# Patient Record
Sex: FEMALE | Race: WHITE | HISPANIC OR LATINO | Employment: UNEMPLOYED | URBAN - METROPOLITAN AREA
[De-identification: names, ages, dates, MRNs, and addresses within clinical notes are randomized per-mention and may not be internally consistent; named-entity substitution may affect disease eponyms.]

---

## 2017-02-27 ENCOUNTER — HOSPITAL ENCOUNTER (EMERGENCY)
Facility: HOSPITAL | Age: 32
Discharge: HOME | End: 2017-02-27
Attending: EMERGENCY MEDICINE
Payer: MEDICAID

## 2017-02-27 VITALS
DIASTOLIC BLOOD PRESSURE: 79 MMHG | HEART RATE: 66 BPM | SYSTOLIC BLOOD PRESSURE: 125 MMHG | HEIGHT: 64 IN | BODY MASS INDEX: 25.1 KG/M2 | RESPIRATION RATE: 20 BRPM | WEIGHT: 147 LBS | OXYGEN SATURATION: 100 % | TEMPERATURE: 99 F

## 2017-02-27 DIAGNOSIS — R05.9 COUGH: ICD-10-CM

## 2017-02-27 DIAGNOSIS — J40 BRONCHITIS: Primary | ICD-10-CM

## 2017-02-27 DIAGNOSIS — Z72.0 TOBACCO ABUSE DISORDER: ICD-10-CM

## 2017-02-27 LAB
B-HCG UR QL: NEGATIVE
CTP QC/QA: YES
DEPRECATED S PYO AG THROAT QL EIA: NEGATIVE

## 2017-02-27 PROCEDURE — 94640 AIRWAY INHALATION TREATMENT: CPT

## 2017-02-27 PROCEDURE — 25000242 PHARM REV CODE 250 ALT 637 W/ HCPCS: Performed by: NURSE PRACTITIONER

## 2017-02-27 PROCEDURE — 87880 STREP A ASSAY W/OPTIC: CPT

## 2017-02-27 PROCEDURE — 99284 EMERGENCY DEPT VISIT MOD MDM: CPT | Mod: 25

## 2017-02-27 PROCEDURE — 81025 URINE PREGNANCY TEST: CPT | Performed by: EMERGENCY MEDICINE

## 2017-02-27 PROCEDURE — 87081 CULTURE SCREEN ONLY: CPT

## 2017-02-27 PROCEDURE — 63600175 PHARM REV CODE 636 W HCPCS: Performed by: NURSE PRACTITIONER

## 2017-02-27 RX ORDER — IPRATROPIUM BROMIDE AND ALBUTEROL SULFATE 2.5; .5 MG/3ML; MG/3ML
3 SOLUTION RESPIRATORY (INHALATION)
Status: COMPLETED | OUTPATIENT
Start: 2017-02-27 | End: 2017-02-27

## 2017-02-27 RX ORDER — PREDNISONE 20 MG/1
60 TABLET ORAL
Status: COMPLETED | OUTPATIENT
Start: 2017-02-27 | End: 2017-02-27

## 2017-02-27 RX ORDER — CETIRIZINE HYDROCHLORIDE 10 MG/1
10 TABLET ORAL DAILY
Qty: 30 TABLET | Refills: 0 | Status: SHIPPED | OUTPATIENT
Start: 2017-02-27 | End: 2017-05-07

## 2017-02-27 RX ORDER — ALBUTEROL SULFATE 90 UG/1
1-2 AEROSOL, METERED RESPIRATORY (INHALATION) EVERY 6 HOURS PRN
Qty: 1 INHALER | Refills: 0 | Status: SHIPPED | OUTPATIENT
Start: 2017-02-27 | End: 2017-07-26 | Stop reason: SDUPTHER

## 2017-02-27 RX ORDER — PREDNISONE 20 MG/1
40 TABLET ORAL DAILY
Qty: 10 TABLET | Refills: 0 | Status: SHIPPED | OUTPATIENT
Start: 2017-02-27 | End: 2017-03-04

## 2017-02-27 RX ADMIN — PREDNISONE 60 MG: 20 TABLET ORAL at 09:02

## 2017-02-27 RX ADMIN — IPRATROPIUM BROMIDE AND ALBUTEROL SULFATE 3 ML: .5; 3 SOLUTION RESPIRATORY (INHALATION) at 09:02

## 2017-02-27 NOTE — ED AVS SNAPSHOT
[facility]  [address]  [place] [address]               Vani Hernandez   2017  8:47 PM   ED    Description:  Female : 1985   Department:  [facility]           Your Care was Coordinated By:     Provider Role From To    Aurea Del Cid MD Attending Provider 17 --    Augustus Rosenthal NP Nurse Practitioner 17 --      Reason for Visit     Sore Throat           Diagnoses this Visit        Comments    Bronchitis    -  Primary     Cough         Tobacco abuse disorder           ED Disposition     None           To Do List           Follow-up Information     Follow up with [facility] - [facility]. Schedule an appointment as soon as possible for a visit in 1 week.    Specialty:  Family Medicine    Contact information:    [address] [id number] [phone]         These Medications        Disp Refills Start End    albuterol 90 mcg/actuation inhaler 1 Inhaler 0 2017    Inhale 1-2 puffs into the lungs every 6 (six) hours as needed for Wheezing. Rescue - Inhalation    predniSONE (DELTASONE) 20 MG tablet 10 tablet 0 2017 3/4/2017    Take 2 tablets (40 mg total) by mouth once daily. - Oral    cetirizine (ZYRTEC) 10 MG tablet 30 tablet 0 2017    Take 1 tablet (10 mg total) by mouth once daily. - Oral      [facility]     [facility] On Call Nurse Care Line -  Assistance  Registered nurses in the [facility] provide clinical advisement, health education, appointment booking, and other advisory services.  Call for this free service at [phone].             Medications           Message regarding Medications     Verify the changes and/or additions to your medication regime listed below are the same as discussed with your clinician today.  If any of these changes or additions are incorrect, please notify your healthcare provider.        START  taking these NEW medications        Refills    albuterol 90 mcg/actuation inhaler 0    Sig: Inhale 1-2 puffs into the lungs every 6 (six) hours as needed for Wheezing. Rescue    Class: Print    Route: Inhalation    predniSONE (DELTASONE) 20 MG tablet 0    Sig: Take 2 tablets (40 mg total) by mouth once daily.    Class: Print    Route: Oral    cetirizine (ZYRTEC) 10 MG tablet 0    Sig: Take 1 tablet (10 mg total) by mouth once daily.    Class: Print    Route: Oral      These medications were administered today        Dose Freq    albuterol-ipratropium 2.5mg-0.5mg/3mL nebulizer solution 3 mL 3 mL ED 1 Time    Sig: Take 3 mLs by nebulization ED 1 Time.    Class: Normal    Route: Nebulization    predniSONE tablet 60 mg 60 mg ED 1 Time    Sig: Take 3 tablets (60 mg total) by mouth ED 1 Time.    Class: Normal    Route: Oral      STOP taking these medications     ALBUTEROL INHL Inhale into the lungs.           Verify that the below list of medications is an accurate representation of the medications you are currently taking.  If none reported, the list may be blank. If incorrect, please contact your healthcare provider. Carry this list with you in case of emergency.           Current Medications     UNKNOWN TO PATIENT     albuterol 90 mcg/actuation inhaler Inhale 1-2 puffs into the lungs every 6 (six) hours as needed for Wheezing. Rescue    cetirizine (ZYRTEC) 10 MG tablet Take 1 tablet (10 mg total) by mouth once daily.    predniSONE (DELTASONE) 20 MG tablet Take 2 tablets (40 mg total) by mouth once daily.           Clinical Reference Information           Your Vitals Were     BP                   123/82 (BP Location: Left arm, Patient Position: Sitting)           Allergies as of 2/27/2017     No Known Allergies      Immunizations Administered on Date of Encounter - 2/27/2017     None      ED Micro, Lab, POCT     Start Ordered       Status Ordering Provider    02/27/17 1951 02/27/17 1950  POCT urine pregnancy  Once       Final result     02/27/17 1951 02/27/17 1950  Rapid strep screen  STAT      Final result     02/27/17 1950 02/27/17 1950  Strep A culture, throat  Once      In process       ED Imaging Orders     Start Ordered       Status Ordering Provider    02/27/17 2101 02/27/17 2100  X-Ray Chest PA And Lateral  1 time imaging      Final result         Discharge Instructions         Bronchitis with Wheezing (Viral or Bacterial: Adult)    Bronchitis is an infection of the air passages. It often occurs during a cold and is usually caused by a virus. Symptoms include cough with mucus (phlegm) and low-grade fever. This illness is contagious during the first few days and is spread through the air by coughing and sneezing, or by direct contact (touching the sick person and then touching your own eyes, nose, or mouth).  If there is a lot of inflammation, air flow is restricted. The air passages may also go into spasm, especially if you have asthma. This causes wheezing and difficulty breathing even in people who do not have asthma.  Bronchitis usually lasts 7 to 14 days. The wheezing should improve with treatment during the first week. An inhaler is often prescribed to relax the air passages and stop wheezing. Antibiotics will be prescribed if your doctor thinks there is also a secondary bacterial infection.  Home care  · If symptoms are severe, rest at home for the first 2 to 3 days. When you go back to your usual activities, don't let yourself get too tired.  · Do not smoke. Also avoid being exposed to secondhand smoke.  · You may use over-the-counter medicine to control fever or pain, unless another medicine was prescribed. Note: If you have chronic liver or kidney disease or have ever had a stomach ulcer or gastrointestinal bleeding, talk with your healthcare provider before using these medicines. Also talk to your provider if you are taking medicine to prevent blood clots.) Aspirin should never be given to anyone younger than 18  years of age who is ill with a viral infection or fever. It may cause severe liver or brain damage.  · Your appetite may be poor, so a light diet is fine. Avoid dehydration by drinking 6 to 8 glasses of fluids per day (such as water, soft drinks, sports drinks, juices, tea, or soup). Extra fluids will help loosen secretions in the nose and lungs.  · Over-the-counter cough, cold, and sore-throat medicines will not shorten the length of the illness, but they may be helpful to reduce symptoms. (Note: Do not use decongestants if you have high blood pressure.)  · If you were given an inhaler, use it exactly as directed. If you need to use it more often than prescribed, your condition may be worsening. If this happens, contact your healthcare provider.  · If prescribed, finish all antibiotic medicine, even if you are feeling better after only a few days.  Follow-up care  Follow up with your healthcare provider, or as advised. If you had an X-ray or ECG (electrocardiogram), a specialist will review it. You will be notified of any new findings that may affect your care.  Note: If you are age 65 or older, or if you have a chronic lung disease or condition that affects your immune system, or you smoke, talk to your healthcare provider about having a pneumococcal vaccinations and a yearly influenza vaccination (flu shot).  When to seek medical advice  Call your healthcare provider right away if any of these occur:  · Fever of 100.4°F (38°C) or higher  · Coughing up increasing amounts of colored sputum  · Weakness, drowsiness, headache, facial pain, ear pain, or a stiff neck  Call 911, or get immediate medical care  Contact emergency services right away if any of these occur.  · Coughing up blood  · Worsening weakness, drowsiness, headache, or stiff neck  · Increased wheezing not helped with medication, shortness of breath, or pain with breathing  Date Last Reviewed: 9/13/2015  © 8082-3004 [employer] [address]  [place] [address]. All rights reserved. This information is not intended as a substitute for professional medical care. Always follow your healthcare professional's instructions.          Discharge References/Attachments     SMOKING CESSATION (ENGLISH)      [facility] Sign-Up     Activating your [facility] account is as easy as 1-2-3!     1) Visit [email or web address], select Sign Up Now, enter this activation code and your date of birth, then select Next.  [email or web address]  Expires: 4/13/2017  8:00 PM      2) Create a username and password to use when you visit [facility] in the future and select a security question in case you lose your password and select Next.    3) Enter your e-mail address and click Sign Up!    Additional Information  If you have questions, please e-mail [email or web address] or call [phone] to talk to our [facility] staff. Remember, [facility] is NOT to be used for urgent needs. For medical emergencies, dial 911.          [facility] complies with applicable Federal civil rights laws and does not discriminate on the basis of race, color, national origin, age, disability, or sex.        Language Assistance Services     ATTENTION: Language assistance services are available, free of charge. Please call [phone].      ATENCIÓN: Si habla español, tiene a juarez disposición servicios gratuitos de asistencia lingüística. Llame al [phone].     CHÚ Ý: N?u b?n nói Ti?ng Vi?t, có các d?ch v? h? tr? ngôn ng? mi?n phí dành cho b?n. G?i s? [phone].

## 2017-02-28 NOTE — ED NOTES
Pt given instructions for clean catch urine sample. Pt verbalized understanding.  Pt ambulatory to bathroom.

## 2017-02-28 NOTE — ED NOTES
Sore throat with chest tightness x 2 days.  Hx of asthma, has been out of albuterol inhaler x 6 mos.

## 2017-02-28 NOTE — DISCHARGE INSTRUCTIONS
Bronchitis with Wheezing (Viral or Bacterial: Adult)    Bronchitis is an infection of the air passages. It often occurs during a cold and is usually caused by a virus. Symptoms include cough with mucus (phlegm) and low-grade fever. This illness is contagious during the first few days and is spread through the air by coughing and sneezing, or by direct contact (touching the sick person and then touching your own eyes, nose, or mouth).  If there is a lot of inflammation, air flow is restricted. The air passages may also go into spasm, especially if you have asthma. This causes wheezing and difficulty breathing even in people who do not have asthma.  Bronchitis usually lasts 7 to 14 days. The wheezing should improve with treatment during the first week. An inhaler is often prescribed to relax the air passages and stop wheezing. Antibiotics will be prescribed if your doctor thinks there is also a secondary bacterial infection.  Home care  · If symptoms are severe, rest at home for the first 2 to 3 days. When you go back to your usual activities, don't let yourself get too tired.  · Do not smoke. Also avoid being exposed to secondhand smoke.  · You may use over-the-counter medicine to control fever or pain, unless another medicine was prescribed. Note: If you have chronic liver or kidney disease or have ever had a stomach ulcer or gastrointestinal bleeding, talk with your healthcare provider before using these medicines. Also talk to your provider if you are taking medicine to prevent blood clots.) Aspirin should never be given to anyone younger than 18 years of age who is ill with a viral infection or fever. It may cause severe liver or brain damage.  · Your appetite may be poor, so a light diet is fine. Avoid dehydration by drinking 6 to 8 glasses of fluids per day (such as water, soft drinks, sports drinks, juices, tea, or soup). Extra fluids will help loosen secretions in the nose and lungs.  · Over-the-counter  cough, cold, and sore-throat medicines will not shorten the length of the illness, but they may be helpful to reduce symptoms. (Note: Do not use decongestants if you have high blood pressure.)  · If you were given an inhaler, use it exactly as directed. If you need to use it more often than prescribed, your condition may be worsening. If this happens, contact your healthcare provider.  · If prescribed, finish all antibiotic medicine, even if you are feeling better after only a few days.  Follow-up care  Follow up with your healthcare provider, or as advised. If you had an X-ray or ECG (electrocardiogram), a specialist will review it. You will be notified of any new findings that may affect your care.  Note: If you are age 65 or older, or if you have a chronic lung disease or condition that affects your immune system, or you smoke, talk to your healthcare provider about having a pneumococcal vaccinations and a yearly influenza vaccination (flu shot).  When to seek medical advice  Call your healthcare provider right away if any of these occur:  · Fever of 100.4°F (38°C) or higher  · Coughing up increasing amounts of colored sputum  · Weakness, drowsiness, headache, facial pain, ear pain, or a stiff neck  Call 911, or get immediate medical care  Contact emergency services right away if any of these occur.  · Coughing up blood  · Worsening weakness, drowsiness, headache, or stiff neck  · Increased wheezing not helped with medication, shortness of breath, or pain with breathing  Date Last Reviewed: 9/13/2015  © 9302-7487 [employer]. [address] [place] [address], [place] [address]. All rights reserved. This information is not intended as a substitute for professional medical care. Always follow your healthcare professional's instructions.

## 2017-02-28 NOTE — ED PROVIDER NOTES
Encounter Date: 2/27/2017       History     Chief Complaint   Patient presents with    Sore Throat     sore throat and chest tigtness x 2 days. pt has hx of asthma and states that she is out of albuterol. NAD at present.      Review of patient's allergies indicates:  No Known Allergies  HPI Comments: Well appearing, non toxic, afebrile 31 year old female with PMH of asthma presents with c/o sore throat and cough productive of green sputum x 1 week. Pt reports being out of Albuterol inhaler for past couple of month since moving from [place]. No reported fever, chills, SOB, abdominal pain, N/V/D, and urinary symptoms. She does reports some intermittent chest tightness and wheezing. Denies any treatments PTA.    Patient is a 31 y.o. female presenting with the following complaint: URI. The history is provided by the patient.   URI   The primary symptoms include sore throat, cough and wheezing. Primary symptoms do not include fever, fatigue, headaches, ear pain, swollen glands, abdominal pain, nausea, vomiting, myalgias, arthralgias or rash. Illness onset: 1 week ago. This is a new problem. The problem has been gradually worsening.   The sore throat began more than 2 days ago. The sore throat has been gradually improving since its onset. The sore throat is not accompanied by trouble swallowing, drooling, hoarse voice or stridor. The sore throat pain is at a severity of 6/10.   The cough began 6 to 7 days ago. The cough is productive. The sputum is green. It is exacerbated by smoking.   Wheezing began more than 2 days ago. Wheezing occurs intermittently. The wheezing was precipitated by smoke. The patient's medical history is significant for asthma. The patient's medical history does not include COPD or bronchiolitis.   The illness is not associated with chills, plugged ear sensation, facial pain, sinus pressure, congestion or rhinorrhea.     Past Medical History:   Diagnosis Date    Asthma      History reviewed. No  pertinent surgical history.  History reviewed. No pertinent family history.  Social History   Substance Use Topics    Smoking status: Never Smoker    Smokeless tobacco: None    Alcohol use No     Review of Systems   Constitutional: Negative for chills, fatigue and fever.   HENT: Positive for sore throat. Negative for congestion, drooling, ear pain, hoarse voice, rhinorrhea, sinus pressure and trouble swallowing.    Respiratory: Positive for cough, chest tightness and wheezing. Negative for shortness of breath and stridor.    Cardiovascular: Negative for chest pain.   Gastrointestinal: Negative for abdominal distention, abdominal pain, constipation, diarrhea, nausea and vomiting.   Genitourinary: Negative for difficulty urinating and dysuria.   Musculoskeletal: Negative for arthralgias, back pain, gait problem, joint swelling, myalgias, neck pain and neck stiffness.   Skin: Negative for color change, pallor, rash and wound.   Neurological: Negative for dizziness and headaches.       Physical Exam   Initial Vitals   BP Pulse Resp Temp SpO2   02/27/17 1949 02/27/17 1949 02/27/17 1949 02/27/17 1949 02/27/17 1949   123/82 90 18 98.3 °F (36.8 °C) 98 %     Physical Exam    Nursing note and vitals reviewed.  Constitutional: She appears well-developed and well-nourished. She is not diaphoretic. She is cooperative.  Non-toxic appearance. She does not have a sickly appearance. She does not appear ill. No distress.   HENT:   Head: Normocephalic and atraumatic.   Right Ear: Hearing, tympanic membrane, external ear and ear canal normal.   Left Ear: Hearing, tympanic membrane, external ear and ear canal normal.   Nose: Nose normal.   Mouth/Throat: Uvula is midline and mucous membranes are normal. Posterior oropharyngeal erythema present. No oropharyngeal exudate, posterior oropharyngeal edema or tonsillar abscesses.   Eyes: Conjunctivae and EOM are normal. Pupils are equal, round, and reactive to light. Right eye exhibits no  discharge. Left eye exhibits no discharge.   Neck: Normal range of motion. Neck supple. No spinous process tenderness and no muscular tenderness present. No edema, no erythema and normal range of motion present. No rigidity.   Cardiovascular: Normal rate, regular rhythm and intact distal pulses.   Pulmonary/Chest: Effort normal. No accessory muscle usage. No apnea, no tachypnea and no bradypnea. No respiratory distress. She has decreased breath sounds. She has no wheezes. She has no rhonchi. She has no rales. She exhibits no tenderness.   Abdominal: Soft. Bowel sounds are normal. She exhibits no distension. There is no tenderness. There is no rigidity, no rebound, no guarding and no CVA tenderness.   Musculoskeletal: Normal range of motion. She exhibits no edema or tenderness.   Lymphadenopathy:     She has no cervical adenopathy.   Neurological: She is alert and oriented to person, place, and time. She has normal strength. Coordination and gait normal. GCS eye subscore is 4. GCS verbal subscore is 5. GCS motor subscore is 6.   Skin: Skin is warm and dry. No rash and no abscess noted. No erythema. No pallor.   Psychiatric: She has a normal mood and affect. Her behavior is normal. Judgment and thought content normal.         ED Course   Procedures  Labs Reviewed   THROAT SCREEN, RAPID   CULTURE, STREP A,  THROAT   POCT URINE PREGNANCY        Imaging Results         X-Ray Chest PA And Lateral (Final result) Result time:  02/27/17 21:38:45    Final result by Mina Lizama MD (02/27/17 21:38:45)    Impression:     No acute process identified.        Electronically signed by: MINA LIZAMA MD  Date:     02/27/17  Time:    21:38     Narrative:    Cough.    Comparison: None.     Findings:2 view examination.No pneumothorax. The cardiomediastinal contour is within normal limits. No findings to suggest pleural effusions. The lungs are clear.                 Medical Decision Making:   Initial Assessment:   Well appearing,  non toxic, afebrile 31 year old female with PMH of asthma presents with c/o sore throat and cough productive of green sputum x 1 week. Pt reports being out of Albuterol inhaler for past couple of month since moving from [place]. No reported fever, chills, SOB, abdominal pain, N/V/D, and urinary symptoms. She does reports some intermittent chest tightness and wheezing. Denies any treatments PTA. Pt is AAO x3, PERRLA, EOMs intact. Neck supple, non tender, full ROM, no adenopathy. Oropharyngeal erythema without exudates, uvula midline, no evidence of peritonsillar abscess. Chest non tender, breath sounds diminished but CTA, no tachypnea, resp even and unlabored, symmetrical chest rise. Abdomen soft, non tender, non distended, BS active.  Differential Diagnosis:   Asthma Exacerbation, Pneumonia, Bronchitis, Viral Pharyngitis, Strep Pharyngitis.  Clinical Tests:   Lab Tests: Ordered and Reviewed  Radiological Study: Ordered and Reviewed  ED Management:  UPT negative, Rapid Strep negative. Chest XR no acute cardiopulmonary process. Treated in ED with Prednisone and Duo-neb. Pt reports feeling much better after treatments provided in ED, no respiratory distress, no tachypnea, breath sounds CTA, and reports relief of chest tightness. Afebrile. Low suspicion for pneumonia. Low suspicion for strep pharyngitis. Pt symptoms likely related to bronchitis. Will treat symptomatically with Albuterol inhaler, Prednisone, and Zyrtec. Increase oral intake of fluids. Stop smoking. FU with PCP provided within next week or sooner for no improvements or worsening. Discussed strict return precautions. Pt in agreement with POC, verbalized understanding.              Attending Attestation:     Physician Attestation Statement for NP/PA:   I discussed this assessment and plan of this patient with the NP/PA, but I did not personally examine the patient. The face to face encounter was performed by the NP/PA.                  ED Course      Clinical Impression:   The primary encounter diagnosis was Bronchitis. Diagnoses of Cough and Tobacco abuse disorder were also pertinent to this visit.          Augustus Rosenthal NP  02/27/17 [id number]       Aurea Del Cid MD  02/28/17 7095

## 2017-03-02 LAB — BACTERIA THROAT CULT: NORMAL

## 2017-05-07 ENCOUNTER — HOSPITAL ENCOUNTER (EMERGENCY)
Facility: HOSPITAL | Age: 32
Discharge: HOME | End: 2017-05-07
Attending: EMERGENCY MEDICINE
Payer: MEDICAID

## 2017-05-07 VITALS
OXYGEN SATURATION: 100 % | HEART RATE: 78 BPM | TEMPERATURE: 99 F | SYSTOLIC BLOOD PRESSURE: 125 MMHG | BODY MASS INDEX: 24.75 KG/M2 | WEIGHT: 145 LBS | HEIGHT: 64 IN | DIASTOLIC BLOOD PRESSURE: 77 MMHG | RESPIRATION RATE: 20 BRPM

## 2017-05-07 DIAGNOSIS — K42.9 UMBILICAL HERNIA WITHOUT OBSTRUCTION AND WITHOUT GANGRENE: Primary | ICD-10-CM

## 2017-05-07 LAB
ALBUMIN SERPL BCP-MCNC: 3.4 G/DL
ALP SERPL-CCNC: 72 U/L
ALT SERPL W/O P-5'-P-CCNC: 22 U/L
ANION GAP SERPL CALC-SCNC: 9 MMOL/L
AST SERPL-CCNC: 19 U/L
B-HCG UR QL: NEGATIVE
BASOPHILS # BLD AUTO: 0.05 K/UL
BASOPHILS NFR BLD: 0.6 %
BILIRUB SERPL-MCNC: 0.3 MG/DL
BILIRUB UR QL STRIP: NEGATIVE
BUN SERPL-MCNC: 29 MG/DL
CALCIUM SERPL-MCNC: 8.8 MG/DL
CHLORIDE SERPL-SCNC: 109 MMOL/L
CLARITY UR: CLEAR
CO2 SERPL-SCNC: 21 MMOL/L
COLOR UR: YELLOW
CREAT SERPL-MCNC: 0.8 MG/DL
CTP QC/QA: YES
DIFFERENTIAL METHOD: NORMAL
EOSINOPHIL # BLD AUTO: 0.3 K/UL
EOSINOPHIL NFR BLD: 3.1 %
ERYTHROCYTE [DISTWIDTH] IN BLOOD BY AUTOMATED COUNT: 13.3 %
EST. GFR  (AFRICAN AMERICAN): >60 ML/MIN/1.73 M^2
EST. GFR  (NON AFRICAN AMERICAN): >60 ML/MIN/1.73 M^2
GLUCOSE SERPL-MCNC: 105 MG/DL
GLUCOSE UR QL STRIP: NEGATIVE
HCT VFR BLD AUTO: 39.7 %
HGB BLD-MCNC: 12.9 G/DL
HGB UR QL STRIP: ABNORMAL
KETONES UR QL STRIP: NEGATIVE
LEUKOCYTE ESTERASE UR QL STRIP: NEGATIVE
LIPASE SERPL-CCNC: 59 U/L
LYMPHOCYTES # BLD AUTO: 2.7 K/UL
LYMPHOCYTES NFR BLD: 34 %
MCH RBC QN AUTO: 27.9 PG
MCHC RBC AUTO-ENTMCNC: 32.5 %
MCV RBC AUTO: 86 FL
MONOCYTES # BLD AUTO: 0.8 K/UL
MONOCYTES NFR BLD: 9.3 %
NEUTROPHILS # BLD AUTO: 4.3 K/UL
NEUTROPHILS NFR BLD: 52.8 %
NITRITE UR QL STRIP: NEGATIVE
PH UR STRIP: 6 [PH] (ref 5–8)
PLATELET # BLD AUTO: 231 K/UL
PMV BLD AUTO: 9.9 FL
POTASSIUM SERPL-SCNC: 4.2 MMOL/L
PROT SERPL-MCNC: 6.7 G/DL
PROT UR QL STRIP: NEGATIVE
RBC # BLD AUTO: 4.62 M/UL
SODIUM SERPL-SCNC: 139 MMOL/L
SP GR UR STRIP: 1.02 (ref 1–1.03)
URN SPEC COLLECT METH UR: ABNORMAL
UROBILINOGEN UR STRIP-ACNC: NEGATIVE EU/DL
WBC # BLD AUTO: 8.06 K/UL

## 2017-05-07 PROCEDURE — 83690 ASSAY OF LIPASE: CPT

## 2017-05-07 PROCEDURE — 80053 COMPREHEN METABOLIC PANEL: CPT

## 2017-05-07 PROCEDURE — 25500020 PHARM REV CODE 255: Performed by: EMERGENCY MEDICINE

## 2017-05-07 PROCEDURE — 85025 COMPLETE CBC W/AUTO DIFF WBC: CPT

## 2017-05-07 PROCEDURE — 81025 URINE PREGNANCY TEST: CPT

## 2017-05-07 PROCEDURE — 99284 EMERGENCY DEPT VISIT MOD MDM: CPT | Mod: 25

## 2017-05-07 PROCEDURE — 81003 URINALYSIS AUTO W/O SCOPE: CPT

## 2017-05-07 RX ORDER — KETOROLAC TROMETHAMINE 10 MG/1
10 TABLET, FILM COATED ORAL EVERY 8 HOURS
Qty: 10 TABLET | Refills: 0 | Status: SHIPPED | OUTPATIENT
Start: 2017-05-07 | End: 2017-10-27

## 2017-05-07 RX ADMIN — IOHEXOL 30 ML: 350 INJECTION, SOLUTION INTRAVENOUS at 08:05

## 2017-05-07 RX ADMIN — IOHEXOL 75 ML: 350 INJECTION, SOLUTION INTRAVENOUS at 10:05

## 2017-05-07 NOTE — ED NOTES
APPEARANCE: Alert, oriented and in no acute distress.  CARDIAC: Normal rate and rhythm, no murmur heard.   PERIPHERAL VASCULAR: peripheral pulses present. Normal cap refill. No edema. Warm to touch.    RESPIRATORY:Normal rate and effort, breath sounds clear bilaterally throughout chest. Respirations are equal and unlabored no obvious signs of distress.  GASTRO: soft, bowel sounds normal, Tenderness to lower and mid abdomen, no abdominal distention.  MUSC: Full ROM. No bony tenderness or soft tissue tenderness. No obvious deformity.  SKIN: Skin is warm and dry, normal skin turgor, mucous membranes moist.  NEURO: 5/5 strength major flexors/extensors bilaterally. Sensory intact to light touch bilaterally. [place] coma scale: eyes open spontaneously-4, oriented & converses-5, obeys commands-6. No neurological abnormalities.   MENTAL STATUS: awake, alert and aware of environment.  EYE: PERRL, both eyes: pupils brisk and reactive to light. Normal size.  ENT: EARS: no obvious drainage. NOSE: no active bleeding.   Abdominal Pain to center of abdomen and nausea since yesterday. Concerned that she may have a hernia

## 2017-05-07 NOTE — ED AVS SNAPSHOT
[facility]  [address]  [place] [address]               Vani Hernandez   2017  8:10 AM   ED    Description:  Female : 1985   Department:  [facility]           Your Care was Coordinated By:     Provider Role From To    Deysi Alas MD Attending Provider 17 0812 --      Reason for Visit     Abdominal Pain           Diagnoses this Visit        Comments    Umbilical hernia without obstruction and without gangrene    -  Primary       ED Disposition     None           To Do List           Follow-up Information     Schedule an appointment as soon as possible for a visit with Mellisa Loera MD.    Specialties:  General Surgery, Surgery    Contact information:    [address]  [address]  [place] [address]  [phone]         These Medications        Disp Refills Start End    ketorolac (TORADOL) 10 mg tablet 10 tablet 0 2017     Take 1 tablet (10 mg total) by mouth every 8 (eight) hours. - Oral      [facility]     [facility] -  Assistance  Unless otherwise directed by your provider, please contact [facility], our nurse care line that is available for  assistance.     Registered nurses in the [facility] provide: appointment scheduling, clinical advisement, health education, and other advisory services.  Call: [phone] (toll free)               Medications           Message regarding Medications     Verify the changes and/or additions to your medication regime listed below are the same as discussed with your clinician today.  If any of these changes or additions are incorrect, please notify your healthcare provider.        START taking these NEW medications        Refills    ketorolac (TORADOL) 10 mg tablet 0    Sig: Take 1 tablet (10 mg total) by mouth every 8 (eight) hours.    Class: Print    Route: Oral      These medications were administered today         "Dose Freq    omnipaque 350 iohexol 30 mL 30 mL IMG once as needed    Sig: Take 30 mLs by mouth ONCE PRN for contrast.    Class: Normal    Route: Oral    omnipaque 350 iohexol 75 mL 75 mL IMG once as needed    Sig: Inject 75 mLs into the vein ONCE PRN for contrast.    Class: Normal    Route: Intravenous      STOP taking these medications     cetirizine (ZYRTEC) 10 MG tablet Take 1 tablet (10 mg total) by mouth once daily.           Verify that the below list of medications is an accurate representation of the medications you are currently taking.  If none reported, the list may be blank. If incorrect, please contact your healthcare provider. Carry this list with you in case of emergency.           Current Medications     albuterol 90 mcg/actuation inhaler Inhale 1-2 puffs into the lungs every 6 (six) hours as needed for Wheezing. Rescue    UNKNOWN TO PATIENT     ketorolac (TORADOL) 10 mg tablet Take 1 tablet (10 mg total) by mouth every 8 (eight) hours.           Clinical Reference Information           Your Vitals Were     BP Pulse Temp Resp Height Weight    123/71 (BP Location: Right arm, Patient Position: Sitting) 79 98.5 °F (36.9 °C) (Oral) 18 5' 4" (1.626 m) 65.8 kg (145 lb)    Last Period SpO2 BMI          04/16/2017 97% 24.89 kg/m2        Allergies as of 5/7/2017     No Known Allergies      Immunizations Administered on Date of Encounter - 5/7/2017     None      ED Micro, Lab, POCT     Start Ordered       Status Ordering Provider    05/07/17 0814 05/07/17 0813  Lipase  STAT      Final result     05/07/17 0814 05/07/17 0813  Urinalysis Clean Catch  STAT      Final result     05/07/17 0814 05/07/17 0813    Once,   Status:  Canceled      Canceled     05/07/17 0813 05/07/17 0813  CBC auto differential  STAT      Final result     05/07/17 0813 05/07/17 0813  Comprehensive metabolic panel  STAT      Final result     05/07/17 0000 05/07/17 0831  POCT urine pregnancy     Comments:  This order was created through " External Result Entry    Completed       ED Imaging Orders     Start Ordered       Status Ordering Provider    05/07/17 0824 05/07/17 0823  CT Abdomen Pelvis With Contrast  1 time imaging      Final result         Discharge Instructions         Hernia (Adult)    A hernia can happen when there is a weakness or defect in the wall of the abdomen or groin. Intestines or nearby tissues may move from their usual location and push through the weakness in the wall. This can cause a hernia (bulge) you may see or feel.  Causes and Risk Factors   A hernia may be present at birth. Or it may be caused by the wear and tear of daily living. Certain factors can make a hernia more likely. These can include:  · Heavy lifting  · Straining, whether from lifting, movement, or constipation  · Chronic cough  · Injury to the abdominal wall  · Excess weight  · Pregnancy  · Prior surgery  · Older age  · Family history of hernia  Symptoms  Symptoms of a hernia may come on suddenly. Or they may appear slowly over time. Some common symptoms include:  · Bulge in the groin area, around the navel, or in the scrotum (the bulge may get bigger when you stand and go away when you lie down)  · Pain or pressure around the bulge  · Pain during activities such as lifting, coughing, or sneezing  · A feeling of weakness or pressure in the groin  · Pain or swelling in the scrotum  Types of hernias  There are different types of hernia. The type you have depends on its location:  · Inguinal: This type is in the groin or scrotum. It is more common in men.  · Femoral: This type is in the groin, upper thigh (where the leg bends), or labia. It is more common in women.  · Ventral: This type is in the abdominal wall.  · Umbilical: This type occurs around the navel (belly button).  · Incisional: This type occurs at the site of a previous surgery.  The condition of the hernia can help determine how urgently it needs to be treated.  · Reducible: It goes back in by  itself, or it can be pushed back in.  · Irreducible: It cant be pushed back in.  · Incarcerated/Strangulated: The intestine is trapped (incarcerated). If this happens, you wont be able to push the bulge back in. If the incarcerated hernia isnt treated, it may become strangulated. This means the area loses blood supply and the tissue may die. This requires emergency surgery! Treatment is needed right away!  In most cases, a hernia will not heal on its own. Surgery is usually needed to repair the defect in the abdominal wall or groin. Youll be told more about surgery, if needed.  If your symptoms are not severe, treatment may sometimes be delayed. In such cases, regular follow-up visits with the provider will be needed. Youll be asked to keep track of your symptoms and to watch for signs of more serious problems. You may also be given guidelines similar to the home care instructions below.  Home Care  To help keep a hernia from getting worse, you may be advised to:  · Avoid heavy lifting and straining as directed.  · Take steps to prevent constipation, such as eating more fiber and drinking more water. This may help reduce straining that can occur when having a bowel movement. Reducing straining may help keep your symptoms from getting worse.  · Maintain a healthy weight or lose excess weight. This can help reduce strain on abdominal muscles and tissues.  · Stop smoking. This can help prevent coughing that may also strain abdominal muscles and tissues.  Follow-up care  Follow up with your healthcare provider, or as directed. If imaging tests were done, they will be reviewed a doctor. You will be told the results and any new findings that may affect your care.  When to seek medical advice  Call your healthcare provider right away if any of these occur:  · Hernia hardens, swells, or grows larger  · Hernia can no longer be pushed back in  · Pain moves to the lower right abdomen (just below the waistline), or spreads  to the back  Call 911  Call 911 right away if any of these occur:  · Nausea and vomiting  · Severe pain, redness, or tenderness in the area near the hernia  · Pain worsens quickly and doesnt get better  · Inability to have a bowel movement or pass gas  · Fever of 100.4°F (38°C) or higher  · Trouble breathing  · Fainting  · Rapid heart rate  · Vomiting blood  · Large amounts of blood in stool  Date Last Reviewed: 6/9/2015  © 3130-9976 [employer]. [address] [place] [address]. All rights reserved. This information is not intended as a substitute for professional medical care. Always follow your healthcare professional's instructions.          [facility] Sign-Up     Activating your [facility] account is as easy as 1-2-3!     1) Visit [email or web address], select Sign Up Now, enter this activation code and your date of birth, then select Next.  [email or web address]  Expires: 6/21/2017 10:48 AM      2) Create a username and password to use when you visit [facility] in the future and select a security question in case you lose your password and select Next.    3) Enter your e-mail address and click Sign Up!    Additional Information  If you have questions, please e-mail [email or web address] or call [phone] to talk to our [facility] staff. Remember, [facility] is NOT to be used for urgent needs. For medical emergencies, dial 911.         Smoking Cessation     If you would like to quit smoking:   You may be eligible for free services if you are a [place] resident and started smoking cigarettes before September 1, 1988.  Call the Smoking Cessation Trust (SCT) toll free at (124) 750-7799 or (835) 422-2437.   Call -800-QUIT-NOW if you do not meet the above criteria.   Contact us via email: [email or web address]   View our website for more information: [email or web address]         [facility] complies with applicable Federal civil rights laws and does not discriminate on  the basis of race, color, national origin, age, disability, or sex.        Language Assistance Services     ATTENTION: Language assistance services are available, free of charge. Please call [phone].      ATENCIÓN: Si madi bustamante, tiene a juarez disposición servicios gratuitos de asistencia lingüística. Llame al [phone].     CHÚ Ý: N?u b?n nói Ti?ng Vi?t, có các d?ch v? h? tr? ngôn ng? mi?n phí dành cho b?n. G?i s? [phone].

## 2017-05-07 NOTE — ED PROVIDER NOTES
"Encounter Date: 5/7/2017       History     Chief Complaint   Patient presents with    Abdominal Pain     pain to center of abdomen and nausea since yesterday. Concerned that she may have a hernia     Review of patient's allergies indicates:  No Known Allergies  HPI Comments: 31 y/o F presents with periumbilical pain since yesterday.  Pain began yesterday while pt was sitting.  No heavy lifting or trauma.  She reports the pain is constant and dull.  Non-radiating.  Pain currently 5/10.  Pt reports + nausea.  No vomiting or diarrhea.  Last bm yesterday and "normal."  No fever/chills/recent illness or sick contacts.  No flank pain or hematuria. No urinary or vaginal complaints.  No hx of abd surgeries in the past.  Pain is exacerbated by deep palpation of mid abd, no relieving factors.     The history is provided by the patient. No  was used.     Past Medical History:   Diagnosis Date    Asthma      History reviewed. No pertinent surgical history.  History reviewed. No pertinent family history.  Social History   Substance Use Topics    Smoking status: Current Some Day Smoker    Smokeless tobacco: None    Alcohol use No     Review of Systems   Constitutional: Negative for activity change, appetite change, chills and fever.   HENT: Negative for congestion.    Eyes: Negative for discharge and redness.   Respiratory: Negative for cough, chest tightness and shortness of breath.    Cardiovascular: Negative for chest pain and palpitations.   Gastrointestinal: Positive for abdominal pain and nausea. Negative for abdominal distention, diarrhea and vomiting.   Endocrine: Negative for polydipsia and polyphagia.   Genitourinary: Negative for decreased urine volume, difficulty urinating, dysuria, flank pain, frequency and hematuria.   Musculoskeletal: Negative for back pain and neck pain.   Skin: Negative for pallor and rash.   Allergic/Immunologic: Negative for immunocompromised state.   Neurological: " Negative for dizziness and headaches.   Psychiatric/Behavioral: Negative for agitation and confusion.   All other systems reviewed and are negative.      Physical Exam   Initial Vitals   BP Pulse Resp Temp SpO2   05/07/17 0808 05/07/17 0808 05/07/17 0808 05/07/17 0808 05/07/17 0808   123/71 79 18 98.5 °F (36.9 °C) 97 %     Physical Exam    Nursing note and vitals reviewed.  Constitutional: She appears well-developed and well-nourished. She is not diaphoretic. No distress.   Well appearing 31 y/o F talking on the phone in no distress.    HENT:   Head: Normocephalic and atraumatic.   Eyes: Conjunctivae and EOM are normal. No scleral icterus.   Neck: Normal range of motion. Neck supple.   Cardiovascular: Normal rate, regular rhythm and normal heart sounds. Exam reveals no gallop and no friction rub.    No murmur heard.  Pulmonary/Chest: Breath sounds normal. No respiratory distress. She has no wheezes. She has no rhonchi. She has no rales.   Abdominal: Soft. Bowel sounds are normal. She exhibits no distension. There is no hepatosplenomegaly. There is tenderness. There is no rebound and no guarding. A hernia is present.       Periumbilical hernia palpated on exam and TTP, no r/r/g.  No incarceration   Musculoskeletal: Normal range of motion.   Lymphadenopathy:     She has no cervical adenopathy.   Neurological: She is alert and oriented to person, place, and time.   Skin: Skin is warm and dry. No rash noted. No erythema.   Psychiatric: She has a normal mood and affect. Her behavior is normal. Judgment and thought content normal.         ED Course   Procedures  Labs Reviewed   CBC W/ AUTO DIFFERENTIAL   COMPREHENSIVE METABOLIC PANEL   LIPASE   URINALYSIS   POCT URINE PREGNANCY             Medical Decision Making:   Initial Assessment:   31 y/o F with periumbilical abd pain of acute onset yesterday  Differential Diagnosis:   DDX:  Hernia, incarcerated hernia, UTI, appendicitis  Clinical Tests:   Lab Tests: Ordered and  Reviewed  The following lab test(s) were unremarkable: CBC, CMP, Urinalysis and UPT  Radiological Study: Ordered and Reviewed  ED Management:  Ct a/p with contrast reveals a fat filled umbilical hernia and a tiny fat filled hernia superior to the umbilicus.  No associated adenopathy or mesenteric stranding.  No signs of incarceration on exam.  I will d/c pt home with pain meds and have her f/u with surgery     Pt counseled on their diagnosis and the importance of following up with PCP.  Pt also cautioned on when to return to ED.  Pt verbalizes understanding of discharge plan and will return to ED immediately if symptoms worsen                     ED Course     Clinical Impression:   The encounter diagnosis was Umbilical hernia without obstruction and without gangrene.    Disposition:   Disposition: Discharged  Condition: Stable       Deysi Alas MD  05/07/17 1042

## 2017-05-07 NOTE — DISCHARGE INSTRUCTIONS
Hernia (Adult)    A hernia can happen when there is a weakness or defect in the wall of the abdomen or groin. Intestines or nearby tissues may move from their usual location and push through the weakness in the wall. This can cause a hernia (bulge) you may see or feel.  Causes and Risk Factors   A hernia may be present at birth. Or it may be caused by the wear and tear of daily living. Certain factors can make a hernia more likely. These can include:  · Heavy lifting  · Straining, whether from lifting, movement, or constipation  · Chronic cough  · Injury to the abdominal wall  · Excess weight  · Pregnancy  · Prior surgery  · Older age  · Family history of hernia  Symptoms  Symptoms of a hernia may come on suddenly. Or they may appear slowly over time. Some common symptoms include:  · Bulge in the groin area, around the navel, or in the scrotum (the bulge may get bigger when you stand and go away when you lie down)  · Pain or pressure around the bulge  · Pain during activities such as lifting, coughing, or sneezing  · A feeling of weakness or pressure in the groin  · Pain or swelling in the scrotum  Types of hernias  There are different types of hernia. The type you have depends on its location:  · Inguinal: This type is in the groin or scrotum. It is more common in men.  · Femoral: This type is in the groin, upper thigh (where the leg bends), or labia. It is more common in women.  · Ventral: This type is in the abdominal wall.  · Umbilical: This type occurs around the navel (belly button).  · Incisional: This type occurs at the site of a previous surgery.  The condition of the hernia can help determine how urgently it needs to be treated.  · Reducible: It goes back in by itself, or it can be pushed back in.  · Irreducible: It cant be pushed back in.  · Incarcerated/Strangulated: The intestine is trapped (incarcerated). If this happens, you wont be able to push the bulge back in. If the incarcerated hernia isnt  treated, it may become strangulated. This means the area loses blood supply and the tissue may die. This requires emergency surgery! Treatment is needed right away!  In most cases, a hernia will not heal on its own. Surgery is usually needed to repair the defect in the abdominal wall or groin. Youll be told more about surgery, if needed.  If your symptoms are not severe, treatment may sometimes be delayed. In such cases, regular follow-up visits with the provider will be needed. Youll be asked to keep track of your symptoms and to watch for signs of more serious problems. You may also be given guidelines similar to the home care instructions below.  Home Care  To help keep a hernia from getting worse, you may be advised to:  · Avoid heavy lifting and straining as directed.  · Take steps to prevent constipation, such as eating more fiber and drinking more water. This may help reduce straining that can occur when having a bowel movement. Reducing straining may help keep your symptoms from getting worse.  · Maintain a healthy weight or lose excess weight. This can help reduce strain on abdominal muscles and tissues.  · Stop smoking. This can help prevent coughing that may also strain abdominal muscles and tissues.  Follow-up care  Follow up with your healthcare provider, or as directed. If imaging tests were done, they will be reviewed a doctor. You will be told the results and any new findings that may affect your care.  When to seek medical advice  Call your healthcare provider right away if any of these occur:  · Hernia hardens, swells, or grows larger  · Hernia can no longer be pushed back in  · Pain moves to the lower right abdomen (just below the waistline), or spreads to the back  Call 911  Call 911 right away if any of these occur:  · Nausea and vomiting  · Severe pain, redness, or tenderness in the area near the hernia  · Pain worsens quickly and doesnt get better  · Inability to have a bowel movement or  pass gas  · Fever of 100.4°F (38°C) or higher  · Trouble breathing  · Fainting  · Rapid heart rate  · Vomiting blood  · Large amounts of blood in stool  Date Last Reviewed: 6/9/2015  © 6709-6270 [employer]. [address] [place] [address], [place] [address]. All rights reserved. This information is not intended as a substitute for professional medical care. Always follow your healthcare professional's instructions.

## 2017-07-26 ENCOUNTER — OFFICE VISIT (OUTPATIENT)
Dept: FAMILY MEDICINE | Facility: HOSPITAL | Age: 32
End: 2017-07-26
Attending: FAMILY MEDICINE
Payer: MEDICAID

## 2017-07-26 VITALS
SYSTOLIC BLOOD PRESSURE: 117 MMHG | HEART RATE: 54 BPM | HEIGHT: 64 IN | BODY MASS INDEX: 24.72 KG/M2 | DIASTOLIC BLOOD PRESSURE: 83 MMHG | WEIGHT: 144.81 LBS

## 2017-07-26 DIAGNOSIS — K21.9 GASTROESOPHAGEAL REFLUX DISEASE, ESOPHAGITIS PRESENCE NOT SPECIFIED: ICD-10-CM

## 2017-07-26 DIAGNOSIS — J45.909 ASTHMA DUE TO ENVIRONMENTAL ALLERGIES: ICD-10-CM

## 2017-07-26 DIAGNOSIS — Z72.51 UNPROTECTED SEX: ICD-10-CM

## 2017-07-26 DIAGNOSIS — Z00.00 ENCOUNTER FOR MEDICARE ANNUAL WELLNESS EXAM: ICD-10-CM

## 2017-07-26 DIAGNOSIS — K42.9 UMBILICAL HERNIA WITHOUT OBSTRUCTION AND WITHOUT GANGRENE: Primary | ICD-10-CM

## 2017-07-26 DIAGNOSIS — B96.89 BACTERIAL VAGINOSIS: ICD-10-CM

## 2017-07-26 DIAGNOSIS — N76.0 BACTERIAL VAGINOSIS: ICD-10-CM

## 2017-07-26 LAB
B-HCG UR QL: NEGATIVE
CTP QC/QA: YES

## 2017-07-26 PROCEDURE — 81025 URINE PREGNANCY TEST: CPT | Performed by: STUDENT IN AN ORGANIZED HEALTH CARE EDUCATION/TRAINING PROGRAM

## 2017-07-26 PROCEDURE — 99213 OFFICE O/P EST LOW 20 MIN: CPT | Performed by: STUDENT IN AN ORGANIZED HEALTH CARE EDUCATION/TRAINING PROGRAM

## 2017-07-26 RX ORDER — PANTOPRAZOLE SODIUM 40 MG/1
40 TABLET, DELAYED RELEASE ORAL DAILY
Status: DISCONTINUED | OUTPATIENT
Start: 2017-07-26 | End: 2017-10-27

## 2017-07-26 RX ORDER — FLUTICASONE FUROATE AND VILANTEROL 100; 25 UG/1; UG/1
1 POWDER RESPIRATORY (INHALATION) DAILY
Qty: 1 EACH | Refills: 1 | Status: SHIPPED | OUTPATIENT
Start: 2017-07-26 | End: 2017-08-01

## 2017-07-26 RX ORDER — METRONIDAZOLE 500 MG/1
500 TABLET ORAL EVERY 12 HOURS
Qty: 14 TABLET | Refills: 0 | Status: SHIPPED | OUTPATIENT
Start: 2017-07-26 | End: 2017-08-02

## 2017-07-26 RX ORDER — ALBUTEROL SULFATE 90 UG/1
1-2 AEROSOL, METERED RESPIRATORY (INHALATION) EVERY 6 HOURS PRN
Qty: 1 INHALER | Refills: 0 | Status: SHIPPED | OUTPATIENT
Start: 2017-07-26 | End: 2017-07-26 | Stop reason: SDUPTHER

## 2017-07-26 RX ORDER — ALBUTEROL SULFATE 90 UG/1
1-2 AEROSOL, METERED RESPIRATORY (INHALATION) EVERY 6 HOURS PRN
Qty: 1 INHALER | Refills: 0 | Status: SHIPPED | OUTPATIENT
Start: 2017-07-26 | End: 2017-11-15

## 2017-07-26 NOTE — PROGRESS NOTES
Subjective:       Patient ID: Vani Hernandez is a 32 y.o. female.    Chief Complaint: Establish Care    Pt is a 31 yo  female came to the clinic to establish care. She has been healthy. She went to the ER because of belly pain, CT abdomen only showed umbilical hernia and blood works are not significant, no leukocytosis. Her symptom subsides but still feel the tenderness when her kids touch.     Pt has upset stomach in the morning and feel very acidic. This has been an ongoing problem. She had a endoscope done (no on file) in [place], and result was normal.     She has seasonal allergy and that worsens her asthma. She does not use albuterol everyday and does wake up at nights. She runs out of albuterol because her kids played with it.    She complains of foul smelling after unprotected sex with her significant others for about an year. And had a history of BV treated by metronidazole. And she would like a HIV and STD testings. Her LMP is July 15, regular and normal.     Pap smear last year in [place] was normal.       Review of Systems   Respiratory: Negative for apnea, cough, choking, chest tightness, shortness of breath and wheezing.    Gastrointestinal: Negative for abdominal distention and abdominal pain.   Genitourinary: Negative for difficulty urinating, dysuria, hematuria, pelvic pain, vaginal bleeding and vaginal discharge.   Allergic/Immunologic: Positive for environmental allergies.       Objective:      Vitals:    07/26/17 0907   BP: 117/83   Pulse: (!) 54     Physical Exam   Constitutional: She is oriented to person, place, and time. She appears well-developed and well-nourished. No distress.   HENT:   Head: Normocephalic and atraumatic.   Eyes: EOM are normal.   Neck: Normal range of motion. Neck supple.   Cardiovascular: Normal rate, regular rhythm and normal heart sounds.  Exam reveals no gallop and no friction rub.    No murmur heard.  Pulmonary/Chest: Effort normal and breath sounds normal. No  respiratory distress. She has no wheezes. She exhibits no tenderness.   Abdominal: Soft. Bowel sounds are normal. She exhibits no distension. There is tenderness (at the umbilical hernia). There is no rebound and no guarding. A hernia is present.   Musculoskeletal: Normal range of motion.   Neurological: She is alert and oriented to person, place, and time.   Skin: Skin is warm. She is not diaphoretic.   Psychiatric: She has a normal mood and affect. Her behavior is normal. Judgment and thought content normal.       Assessment:       1. Umbilical hernia without obstruction and without gangrene    2. Gastroesophageal reflux disease, esophagitis presence not specified    3. Encounter for Medicare annual wellness exam    4. Bacterial vaginosis    5. Asthma due to environmental allergies    6. Unprotected sex        Plan:       Umbilical hernia without obstruction and without gangrene  -     Ambulatory consult to General Surgery    Gastroesophageal reflux disease, esophagitis presence not specified  -     pantoprazole EC tablet 40 mg; Take 1 tablet (40 mg total) by mouth once daily.    Encounter for Medicare annual wellness exam  -     HIV-I Antibody Confirmation by Western Blot; Future; Expected date: 07/26/2017    Bacterial vaginosis  -     POCT Urine Pregnancy  -     HIV-I Antibody Confirmation by Western Blot; Future; Expected date: 07/26/2017  -     Urinalysis  -     POCT URINE DIPSTICK WITH MICROSCOPE, AUTOMATED  -     C. trachomatis/N. gonorrhoeae by AMP DNA Urine  -     metronidazole (FLAGYL) 500 MG tablet; Take 1 tablet (500 mg total) by mouth every 12 (twelve) hours.  Dispense: 14 tablet; Refill: 0    Asthma due to environmental allergies  -     albuterol 90 mcg/actuation inhaler; Inhale 1-2 puffs into the lungs every 6 (six) hours as needed for Wheezing. Rescue  Dispense: 1 Inhaler; Refill: 0  -     fluticasone-vilanterol (BREO ELLIPTA) 100-25 mcg/dose diskus inhaler; Inhale 1 puff into the lungs once daily.  Controller  Dispense: 1 each; Refill: 1    Unprotected sex  -     HIV-I Antibody Confirmation by Western Blot; Future; Expected date: 07/26/2017  -     Hepatitis B surface antigen; Future; Expected date: 07/26/2017  -     Hepatitis C antibody; Future; Expected date: 07/26/2017  -     C. trachomatis/N. gonorrhoeae by AMP DNA Urine      No Follow-up on file.

## 2017-08-01 ENCOUNTER — TELEPHONE (OUTPATIENT)
Dept: FAMILY MEDICINE | Facility: HOSPITAL | Age: 32
End: 2017-08-01

## 2017-08-01 NOTE — TELEPHONE ENCOUNTER
I sent in a generic Dulera inhaler to her pharmacy and called her to let her know, because Breo was not covered by her insurance. She seems pleasant. She will check with her pharmacy for this medication.

## 2017-10-19 PROBLEM — K42.9 UMBILICAL HERNIA WITHOUT OBSTRUCTION AND WITHOUT GANGRENE: Status: ACTIVE | Noted: 2017-10-19

## 2017-10-19 PROBLEM — J45.20 MILD INTERMITTENT ASTHMA WITHOUT COMPLICATION: Status: ACTIVE | Noted: 2017-10-19

## 2017-10-19 PROBLEM — R10.33 PERIUMBILICAL ABDOMINAL PAIN: Status: ACTIVE | Noted: 2017-10-19

## 2017-10-27 ENCOUNTER — ANESTHESIA EVENT (OUTPATIENT)
Dept: SURGERY | Facility: HOSPITAL | Age: 32
End: 2017-10-27
Payer: MEDICAID

## 2017-10-27 ENCOUNTER — HOSPITAL ENCOUNTER (OUTPATIENT)
Dept: PREADMISSION TESTING | Facility: HOSPITAL | Age: 32
Discharge: HOME | End: 2017-10-27
Attending: SURGERY
Payer: MEDICAID

## 2017-10-27 ENCOUNTER — CLINICAL SUPPORT (OUTPATIENT)
Dept: LAB | Facility: HOSPITAL | Age: 32
End: 2017-10-27
Attending: SURGERY
Payer: MEDICAID

## 2017-10-27 DIAGNOSIS — K42.9 UMBILICAL HERNIA WITHOUT OBSTRUCTION AND WITHOUT GANGRENE: ICD-10-CM

## 2017-10-27 DIAGNOSIS — K42.9 UMBILICAL HERNIA WITHOUT OBSTRUCTION AND WITHOUT GANGRENE: Primary | ICD-10-CM

## 2017-10-27 PROCEDURE — 93010 ELECTROCARDIOGRAM REPORT: CPT | Mod: ,,, | Performed by: INTERNAL MEDICINE

## 2017-10-27 PROCEDURE — 93005 ELECTROCARDIOGRAM TRACING: CPT

## 2017-10-27 PROCEDURE — 93010 EKG 12-LEAD: ICD-10-PCS | Mod: ,,, | Performed by: INTERNAL MEDICINE

## 2017-10-27 RX ORDER — SODIUM CHLORIDE, SODIUM LACTATE, POTASSIUM CHLORIDE, CALCIUM CHLORIDE 600; 310; 30; 20 MG/100ML; MG/100ML; MG/100ML; MG/100ML
INJECTION, SOLUTION INTRAVENOUS CONTINUOUS
Status: CANCELLED | OUTPATIENT
Start: 2017-10-27

## 2017-10-27 RX ORDER — LIDOCAINE HYDROCHLORIDE 10 MG/ML
1 INJECTION, SOLUTION EPIDURAL; INFILTRATION; INTRACAUDAL; PERINEURAL ONCE
Status: CANCELLED | OUTPATIENT
Start: 2017-10-27 | End: 2017-10-27

## 2017-10-27 NOTE — ANESTHESIA PREPROCEDURE EVALUATION
10/27/2017  Vani Hernandez is a 32 y.o., female  Is scheduled for umbilical hernia repair under GETA on 10/30/2017.    History reviewed. No pertinent surgical history.    Anesthesia Evaluation    I have reviewed the Patient Summary Reports.    I have reviewed the Nursing Notes.   I have reviewed the Medications.     Review of Systems  Anesthesia Hx:  No previous Anesthesia  Neg history of prior surgery. Denies Family Hx of Anesthesia complications.    Social:  Former Smoker, No Alcohol Use    Hematology/Oncology:  Hematology Normal        EENT/Dental:EENT/Dental Normal   Cardiovascular:   Exercise tolerance: good Denies Hypertension.  Denies Dysrhythmias.   Denies Angina.        Pulmonary:   Asthma (most recent exacerbation approx 2/2017 and started inhaler at that time. Pt states has not used inhaler since that time) mild  Denies Shortness of breath.    Renal/:  Renal/ Normal     Hepatic/GI:   GERD, well controlled Pt with nausea/vomiting daily and with constipation; with umbilical pain that is intermittent; pt will communicate with Dr. Loera today   Neurological:  Neurology Normal    Endocrine:  Endocrine Normal        Physical Exam  General:  Obesity    Airway/Jaw/Neck:  Airway Findings: Mouth Opening: Normal Tongue: Normal  General Airway Assessment: Adult  Mallampati: II  TM Distance: Normal, at least 6 cm        Eyes/Ears/Nose:  EYES/EARS/NOSE FINDINGS: Normal   Dental:  DENTAL FINDINGS: Normal   Chest/Lungs:  Chest/Lungs Clear    Heart/Vascular:  Heart Findings: Normal Heart murmur: negative    Abdomen:  Abdomen Findings:  Normal, Soft, Tenderness  Mild distention with umbilical tenderness       Mental Status:  Mental Status Findings: Normal        Anesthesia Plan  Type of Anesthesia, risks & benefits discussed:  Anesthesia Type:  general  Patient's Preference:   Intra-op Monitoring Plan:    Intra-op Monitoring Plan Comments:   Post Op Pain Control Plan:   Post Op Pain Control Plan Comments:   Induction:   IV  Beta Blocker:  Patient is not currently on a Beta-Blocker (No further documentation required).       Informed Consent:    ASA Score: 2     Day of Surgery Review of History & Physical:        Anesthesia Plan Notes: Anesthesia consent will be obtained prior to procedure on 10/30/2017.        Ready For Surgery From Anesthesia Perspective.

## 2017-10-27 NOTE — DISCHARGE INSTRUCTIONS
Your surgery is scheduled for 10/30/17.    Please report to Outpatient Surgery Intake Office on the 2nd FLOOR at 7:00 a.m.          INSTRUCTIONS IMPORTANT!!!  ¨ Do not eat or drink after 12 midnight-including water. OK to brush teeth, no   gum, candy or mints!          ____  Proceed to [facility] on 10/27/17 for additional blood test.        ____  Do not wear makeup, including mascara.  ____  No powder, lotions or creams to surgical area.  ____  Please remove all jewelry, including piercings and leave at home.  ____  No money or valuables needed. Please leave at home.  ____  Please bring any documents given by your doctor.  ____  If going home the same day, arrange for a ride home. You will not be able to             drive if Anesthesia was used.  ____  Wear loose fitting clothing. Allow for dressings, bandages.  ____  Stop Aspirin, Ibuprofen, Motrin and Aleve at least 3-5 days before surgery, unless otherwise instructed by your doctor, or the nurse.   You MAY use Tylenol/acetaminophen until day of surgery.  ____  Wash the surgical area with Hibiclens the night before surgery, and again the             morning of surgery.  Be sure to rinse hibiclens off completely (if instructed by   nurse).  ____  If you take diabetic medication, do not take am of surgery unless instructed by Doctor.  ____  Call MD for temperature above 101 degrees.  ____ Stop taking any Fish Oil supplement or any Vitamins that contain Vitamin E at least 5 days prior to surgery.  ____ Do Not wear your contact lenses the day of your procedure.  You may wear your glasses.        I have read or had read and explained to me, and understand the above information.  Additional comments or instructions:  For additional questions call [phone]     Take a Hibiclens shower twice a day for 3 days prior to surgery, including the morning of surgery.   Gargle with Listerine twice a day for 3 days prior to surgery, including the morning of  surgery.      Pre-Op Bathing Instructions    Before surgery, you can play an important role in your own health.    Because skin is not sterile, we need to be sure that your skin is as free of germs as possible. By following the instructions below, you can reduce the number of germs on your skin before surgery.    IMPORTANT: You will need to shower with a special soap called Hibiclens*, available at any pharmacy.  If you are allergic to Chlorhexidine (the antiseptic in Hibiclens), use an antibacterial soap such as Dial Soap for your preoperative shower.  You will shower with Hibiclens both the night before your surgery and the morning of your surgery.  Do not use Hibiclens on the head, face or genitals to avoid injury to those areas.    STEP #1: THE NIGHT BEFORE YOUR SURGERY     1. Do not shave the area of your body where your surgery will be performed.  2. Shower and wash your hair and body as usual with your normal soap and shampoo.  3. Rinse your hair and body thoroughly after you shower to remove all soap residue.  4. With your hand, apply one packet of Hibiclens soap to the surgical site.   5. Wash the site gently for five (5) minutes. Do not scrub your skin too hard.   6. Do not wash with your regular soap after Hibiclens is used.  7. Rinse your body thoroughly.  8. Pat yourself dry with a clean, soft towel.  9. Do not use lotion, cream, or powder.  10. Wear clean clothes.    STEP #2: THE MORNING OF YOUR SURGERY     1. Repeat Step #1.    * Not to be used by people allergic to Chlorhexidine.          Anesthesia: General Anesthesia     You are watched continuously during your procedure by your anesthesia provider.     Youre due to have surgery. During surgery, youll be given medicine called anesthesia or anesthetic. This will keep you comfortable and pain-free. Your anesthesia provider will use general anesthesia.  What is general anesthesia?  General anesthesia puts you into a state like deep sleep. It goes  into the bloodstream (IV anesthetics), into the lungs (gas anesthetics), or both. You feel nothing during the procedure. You will not remember it. During the procedure, the anesthesia provider monitors you continuously. He or she checks your heart rate and rhythm, blood pressure, breathing, and blood oxygen.  · IV anesthetics. IV anesthetics are given through an IV line in your arm. Theyre often given first. This is so you are asleep before a gas anesthetic is started. Some kinds of IV anesthetics relieve pain. Others relax you. Your doctor will decide which kind is best in your case.  · Gas anesthetics. Gas anesthetics are breathed into the lungs. They are often used to keep you asleep. They can be given through a facemask or a tube placed in your larynx or trachea (breathing tube).  ¨ If you have a facemask, your anesthesia provider will most likely place it over your nose and mouth while youre still awake. Youll breathe oxygen through the mask as your IV anesthetic is started. Gas anesthetic may be added through the mask.  ¨ If you have a tube in the larynx or trachea, it will be inserted into your throat after youre asleep.  Anesthesia tools and medicines  You will likely have:  · IV anesthetics. These are put into an IV line into your bloodstream.  · Gas anesthetics. You breathe these anesthetics into your lungs, where they pass into your bloodstream.  · Pulse oximeter. This is a small clip that is attached to the end of your finger. This measures your blood oxygen level.  · Electrocardiography leads (electrodes). These are small sticky pads that are placed on your chest. They record your heart rate and rhythm.  · Blood pressure cuff. This reads your blood pressure.  Risks and possible complications  General anesthesia has some risks. These include:  · Breathing problems  · Nausea and vomiting  · Sore throat or hoarseness (usually temporary)  · Allergic reaction to the anesthetic  · Irregular heartbeat  (rare)  · Cardiac arrest (rare)   Anesthesia safety  · Follow all instructions you are given for how long not to eat or drink before your procedure.  · Be sure your doctor knows what medicines and drugs you take. This includes over-the-counter medicines, herbs, supplements, alcohol or other drugs. You will be asked when those were last taken.  · Have an adult family member or friend drive you home after the procedure.  · For the first 24 hours after your surgery:  ¨ Do not drive or use heavy equipment.  ¨ Do not make important decisions or sign legal documents. If important decisions or signing legal documents is necessary during the first 24 hours after surgery, have a trusted family member or spouse act on your behalf.  ¨ Avoid alcohol.  ¨ Have a responsible adult stay with you. He or she can watch for problems and help keep you safe.  Date Last Reviewed: 12/1/2016  © 6342-0220 [employer]. [address] [place] [address], [place] [address]. All rights reserved. This information is not intended as a substitute for professional medical care. Always follow your healthcare professional's instructions.

## 2017-10-27 NOTE — PRE-PROCEDURE INSTRUCTIONS
Renetta  [phone] - friend    Allergies, medical, surgical, family and psychosocial histories reviewed with patient. Periop plan of care reviewed. Preop instructions given, including medications to take and to hold. Time allotted for questions to be addressed.  Patient verbalized understanding.

## 2017-10-30 ENCOUNTER — HOSPITAL ENCOUNTER (OUTPATIENT)
Facility: HOSPITAL | Age: 32
Discharge: HOME | End: 2017-10-30
Attending: SURGERY | Admitting: SURGERY
Payer: MEDICAID

## 2017-10-30 ENCOUNTER — ANESTHESIA (OUTPATIENT)
Dept: PREADMISSION TESTING | Facility: HOSPITAL | Age: 32
End: 2017-10-30
Payer: MEDICAID

## 2017-10-30 DIAGNOSIS — J45.20 MILD INTERMITTENT ASTHMA WITHOUT COMPLICATION: ICD-10-CM

## 2017-10-30 DIAGNOSIS — K42.9 UMBILICAL HERNIA WITHOUT OBSTRUCTION AND WITHOUT GANGRENE: ICD-10-CM

## 2017-10-30 DIAGNOSIS — R10.33 PERIUMBILICAL ABDOMINAL PAIN: ICD-10-CM

## 2017-10-30 PROCEDURE — 36000706: Performed by: SURGERY

## 2017-10-30 PROCEDURE — 37000009 HC ANESTHESIA EA ADD 15 MINS: Performed by: SURGERY

## 2017-10-30 PROCEDURE — 63600175 PHARM REV CODE 636 W HCPCS: Performed by: SURGERY

## 2017-10-30 PROCEDURE — 71000039 HC RECOVERY, EACH ADD'L HOUR: Performed by: SURGERY

## 2017-10-30 PROCEDURE — 25000003 PHARM REV CODE 250: Performed by: NURSE PRACTITIONER

## 2017-10-30 PROCEDURE — 88302 TISSUE EXAM BY PATHOLOGIST: CPT | Performed by: PATHOLOGY

## 2017-10-30 PROCEDURE — 36000707: Performed by: SURGERY

## 2017-10-30 PROCEDURE — 63600175 PHARM REV CODE 636 W HCPCS: Performed by: ANESTHESIOLOGY

## 2017-10-30 PROCEDURE — 37000008 HC ANESTHESIA 1ST 15 MINUTES: Performed by: SURGERY

## 2017-10-30 PROCEDURE — 88302 TISSUE EXAM BY PATHOLOGIST: CPT | Mod: 26,,, | Performed by: PATHOLOGY

## 2017-10-30 PROCEDURE — 25000003 PHARM REV CODE 250: Performed by: ANESTHESIOLOGY

## 2017-10-30 PROCEDURE — 71000015 HC POSTOP RECOV 1ST HR: Performed by: SURGERY

## 2017-10-30 PROCEDURE — 25000003 PHARM REV CODE 250: Performed by: SURGERY

## 2017-10-30 PROCEDURE — 71000016 HC POSTOP RECOV ADDL HR: Performed by: SURGERY

## 2017-10-30 PROCEDURE — 63600175 PHARM REV CODE 636 W HCPCS: Performed by: NURSE ANESTHETIST, CERTIFIED REGISTERED

## 2017-10-30 PROCEDURE — 71000033 HC RECOVERY, INTIAL HOUR: Performed by: SURGERY

## 2017-10-30 RX ORDER — DEXAMETHASONE SODIUM PHOSPHATE 4 MG/ML
INJECTION, SOLUTION INTRA-ARTICULAR; INTRALESIONAL; INTRAMUSCULAR; INTRAVENOUS; SOFT TISSUE
Status: DISCONTINUED | OUTPATIENT
Start: 2017-10-30 | End: 2017-10-30

## 2017-10-30 RX ORDER — SCOLOPAMINE TRANSDERMAL SYSTEM 1 MG/1
PATCH, EXTENDED RELEASE TRANSDERMAL
Status: DISCONTINUED
Start: 2017-10-30 | End: 2017-10-30 | Stop reason: HOSPADM

## 2017-10-30 RX ORDER — ONDANSETRON 2 MG/ML
INJECTION INTRAMUSCULAR; INTRAVENOUS
Status: DISCONTINUED | OUTPATIENT
Start: 2017-10-30 | End: 2017-10-30

## 2017-10-30 RX ORDER — ACETAMINOPHEN 10 MG/ML
INJECTION, SOLUTION INTRAVENOUS
Status: DISCONTINUED | OUTPATIENT
Start: 2017-10-30 | End: 2017-10-30

## 2017-10-30 RX ORDER — ACETAMINOPHEN 325 MG/1
650 TABLET ORAL EVERY 4 HOURS PRN
Status: DISCONTINUED | OUTPATIENT
Start: 2017-10-30 | End: 2017-10-30 | Stop reason: HOSPADM

## 2017-10-30 RX ORDER — HYDROCODONE BITARTRATE AND ACETAMINOPHEN 5; 325 MG/1; MG/1
1 TABLET ORAL EVERY 6 HOURS PRN
Qty: 24 TABLET | Refills: 0 | Status: SHIPPED | OUTPATIENT
Start: 2017-10-30 | End: 2017-11-15

## 2017-10-30 RX ORDER — HYDROCODONE BITARTRATE AND ACETAMINOPHEN 5; 325 MG/1; MG/1
1 TABLET ORAL EVERY 4 HOURS PRN
Status: DISCONTINUED | OUTPATIENT
Start: 2017-10-30 | End: 2017-10-30 | Stop reason: HOSPADM

## 2017-10-30 RX ORDER — PROPOFOL 10 MG/ML
VIAL (ML) INTRAVENOUS
Status: DISCONTINUED | OUTPATIENT
Start: 2017-10-30 | End: 2017-10-30

## 2017-10-30 RX ORDER — ONDANSETRON 2 MG/ML
4 INJECTION INTRAMUSCULAR; INTRAVENOUS DAILY PRN
Status: DISCONTINUED | OUTPATIENT
Start: 2017-10-30 | End: 2017-10-30 | Stop reason: HOSPADM

## 2017-10-30 RX ORDER — SODIUM CHLORIDE 9 MG/ML
INJECTION, SOLUTION INTRAVENOUS CONTINUOUS
Status: DISCONTINUED | OUTPATIENT
Start: 2017-10-30 | End: 2017-10-30 | Stop reason: HOSPADM

## 2017-10-30 RX ORDER — BUPIVACAINE HYDROCHLORIDE 2.5 MG/ML
INJECTION, SOLUTION EPIDURAL; INFILTRATION; INTRACAUDAL
Status: DISCONTINUED | OUTPATIENT
Start: 2017-10-30 | End: 2017-10-30 | Stop reason: HOSPADM

## 2017-10-30 RX ORDER — HYDROMORPHONE HYDROCHLORIDE 2 MG/ML
0.5 INJECTION, SOLUTION INTRAMUSCULAR; INTRAVENOUS; SUBCUTANEOUS EVERY 5 MIN PRN
Status: DISCONTINUED | OUTPATIENT
Start: 2017-10-30 | End: 2017-10-30 | Stop reason: HOSPADM

## 2017-10-30 RX ORDER — LIDOCAINE HCL/PF 100 MG/5ML
SYRINGE (ML) INTRAVENOUS
Status: DISCONTINUED | OUTPATIENT
Start: 2017-10-30 | End: 2017-10-30

## 2017-10-30 RX ORDER — MUPIROCIN 20 MG/G
OINTMENT TOPICAL
Status: DISCONTINUED | OUTPATIENT
Start: 2017-10-30 | End: 2017-10-30 | Stop reason: HOSPADM

## 2017-10-30 RX ORDER — DIPHENHYDRAMINE HYDROCHLORIDE 50 MG/ML
12.5 INJECTION INTRAMUSCULAR; INTRAVENOUS EVERY 6 HOURS PRN
Status: DISCONTINUED | OUTPATIENT
Start: 2017-10-30 | End: 2017-10-30 | Stop reason: HOSPADM

## 2017-10-30 RX ORDER — SODIUM CHLORIDE 0.9 % (FLUSH) 0.9 %
3 SYRINGE (ML) INJECTION
Status: DISCONTINUED | OUTPATIENT
Start: 2017-10-30 | End: 2017-10-30 | Stop reason: HOSPADM

## 2017-10-30 RX ORDER — FENTANYL CITRATE 50 UG/ML
INJECTION, SOLUTION INTRAMUSCULAR; INTRAVENOUS
Status: DISCONTINUED | OUTPATIENT
Start: 2017-10-30 | End: 2017-10-30

## 2017-10-30 RX ORDER — LIDOCAINE HYDROCHLORIDE 10 MG/ML
1 INJECTION, SOLUTION EPIDURAL; INFILTRATION; INTRACAUDAL; PERINEURAL ONCE
Status: DISCONTINUED | OUTPATIENT
Start: 2017-10-30 | End: 2017-10-30 | Stop reason: HOSPADM

## 2017-10-30 RX ORDER — HYDROCODONE BITARTRATE AND ACETAMINOPHEN 10; 325 MG/1; MG/1
1 TABLET ORAL EVERY 4 HOURS PRN
Status: DISCONTINUED | OUTPATIENT
Start: 2017-10-30 | End: 2017-10-30 | Stop reason: HOSPADM

## 2017-10-30 RX ORDER — CEFAZOLIN SODIUM 2 G/50ML
2 SOLUTION INTRAVENOUS
Status: COMPLETED | OUTPATIENT
Start: 2017-10-30 | End: 2017-10-30

## 2017-10-30 RX ORDER — SCOLOPAMINE TRANSDERMAL SYSTEM 1 MG/1
1 PATCH, EXTENDED RELEASE TRANSDERMAL
Status: DISCONTINUED | OUTPATIENT
Start: 2017-10-30 | End: 2017-10-30 | Stop reason: HOSPADM

## 2017-10-30 RX ORDER — MIDAZOLAM HYDROCHLORIDE 1 MG/ML
INJECTION, SOLUTION INTRAMUSCULAR; INTRAVENOUS
Status: DISCONTINUED | OUTPATIENT
Start: 2017-10-30 | End: 2017-10-30

## 2017-10-30 RX ORDER — SODIUM CHLORIDE, SODIUM LACTATE, POTASSIUM CHLORIDE, CALCIUM CHLORIDE 600; 310; 30; 20 MG/100ML; MG/100ML; MG/100ML; MG/100ML
INJECTION, SOLUTION INTRAVENOUS CONTINUOUS
Status: DISCONTINUED | OUTPATIENT
Start: 2017-10-30 | End: 2017-10-30 | Stop reason: HOSPADM

## 2017-10-30 RX ORDER — BACITRACIN 50000 [IU]/1
INJECTION, POWDER, FOR SOLUTION INTRAMUSCULAR
Status: DISCONTINUED | OUTPATIENT
Start: 2017-10-30 | End: 2017-10-30 | Stop reason: HOSPADM

## 2017-10-30 RX ADMIN — HYDROCODONE BITARTRATE AND ACETAMINOPHEN 1 TABLET: 10; 325 TABLET ORAL at 12:10

## 2017-10-30 RX ADMIN — CEFAZOLIN SODIUM 2 G: 2 SOLUTION INTRAVENOUS at 09:10

## 2017-10-30 RX ADMIN — HYDROMORPHONE HYDROCHLORIDE 0.5 MG: 2 INJECTION, SOLUTION INTRAMUSCULAR; INTRAVENOUS; SUBCUTANEOUS at 10:10

## 2017-10-30 RX ADMIN — HYDROMORPHONE HYDROCHLORIDE 0.5 MG: 2 INJECTION, SOLUTION INTRAMUSCULAR; INTRAVENOUS; SUBCUTANEOUS at 11:10

## 2017-10-30 RX ADMIN — FENTANYL CITRATE 50 MCG: 50 INJECTION, SOLUTION INTRAMUSCULAR; INTRAVENOUS at 09:10

## 2017-10-30 RX ADMIN — LIDOCAINE HYDROCHLORIDE 60 MG: 20 INJECTION, SOLUTION INTRAVENOUS at 09:10

## 2017-10-30 RX ADMIN — ONDANSETRON 4 MG: 2 INJECTION INTRAMUSCULAR; INTRAVENOUS at 12:10

## 2017-10-30 RX ADMIN — ACETAMINOPHEN 1000 MG: 10 INJECTION, SOLUTION INTRAVENOUS at 09:10

## 2017-10-30 RX ADMIN — SODIUM CHLORIDE, SODIUM LACTATE, POTASSIUM CHLORIDE, AND CALCIUM CHLORIDE: .6; .31; .03; .02 INJECTION, SOLUTION INTRAVENOUS at 10:10

## 2017-10-30 RX ADMIN — ONDANSETRON 4 MG: 2 INJECTION, SOLUTION INTRAMUSCULAR; INTRAVENOUS at 10:10

## 2017-10-30 RX ADMIN — ONDANSETRON 4 MG: 2 INJECTION, SOLUTION INTRAMUSCULAR; INTRAVENOUS at 09:10

## 2017-10-30 RX ADMIN — FENTANYL CITRATE 50 MCG: 50 INJECTION, SOLUTION INTRAMUSCULAR; INTRAVENOUS at 10:10

## 2017-10-30 RX ADMIN — SCOPALAMINE 1 PATCH: 1 PATCH, EXTENDED RELEASE TRANSDERMAL at 08:10

## 2017-10-30 RX ADMIN — MIDAZOLAM 2 MG: 1 INJECTION INTRAMUSCULAR; INTRAVENOUS at 09:10

## 2017-10-30 RX ADMIN — MUPIROCIN: 20 OINTMENT TOPICAL at 08:10

## 2017-10-30 RX ADMIN — DEXAMETHASONE SODIUM PHOSPHATE 8 MG: 4 INJECTION, SOLUTION INTRAMUSCULAR; INTRAVENOUS at 09:10

## 2017-10-30 RX ADMIN — PROPOFOL 200 MG: 10 INJECTION, EMULSION INTRAVENOUS at 09:10

## 2017-10-30 RX ADMIN — SODIUM CHLORIDE, SODIUM LACTATE, POTASSIUM CHLORIDE, AND CALCIUM CHLORIDE 10 ML/HR: .6; .31; .03; .02 INJECTION, SOLUTION INTRAVENOUS at 08:10

## 2017-10-30 RX ADMIN — FENTANYL CITRATE 100 MCG: 50 INJECTION, SOLUTION INTRAMUSCULAR; INTRAVENOUS at 10:10

## 2017-10-30 NOTE — H&P
General Surgery H & P    Date of Admit: 10/30/2017    Chief Complaint     Umbilical hernia    Subjective:      History of Present Illness:  Vani Hernandez is a 32 y.o. F w/ a PMHx asthma, GERD-like symptoms who presents for elective umbilical hernia. No prior abdominal surgeries. States it has been present for as long as she can remember but has been bothering her for 2 months. CT scan in May revealed 2 fat-filled umbilical hernias.  CT scan was obtained because of abdominal pain.  She is having some intermittent nausea, vomiting and constipation. Emesis is non-bilious, non-bloody and color of food. She does smoke marijuana semi-regularly.    Past Medical History:  Past Medical History:   Diagnosis Date    Asthma      Past Surgical History:  History reviewed. No pertinent surgical history.    Allergies:  Review of patient's allergies indicates:  No Known Allergies    Home Medications:  Prior to Admission medications    Medication Sig Start Date End Date Taking? Authorizing Provider   albuterol 90 mcg/actuation inhaler Inhale 1-2 puffs into the lungs every 6 (six) hours as needed for Wheezing. Rescue 7/26/17 7/26/18  Bill Mathias III, MD   mometasone-formoterol 100-5 mcg/actuation HFAA Controller 8/1/17   Linda Rodriguez MD   UNKNOWN TO PATIENT     Historical Provider, MD     Family History:  History reviewed. No pertinent family history.    Social History:  Social History   Substance Use Topics    Smoking status: Former Smoker    Smokeless tobacco: Never Used    Alcohol use No   +THC    Review of Systems:  Cardiovascular: negative for chest pain, dyspnea, fatigue and palpitations  Gastrointestinal: positive for abdominal pain, constipation, reflux symptoms and vomiting, negative for bright red blood per rectum, dysphagia and jaundice  Genitourinary:negative for dysuria, frequency, hematuria and hesitancy  Musculoskeletal:negative for arthralgias, back pain, muscle weakness and myalgias  Neurological:  "negative for coordination problems, dizziness, memory problems and seizures All other systems are reviewed and are negative.     Objective:   24 Hour Vital Signs:  BP  Min: 124/70  Max: 124/70  Temp  Av.2 °F (36.8 °C)  Min: 98.2 °F (36.8 °C)  Max: 98.2 °F (36.8 °C)  Pulse  Av  Min: 53  Max: 53  Resp  Av  Min: 16  Max: 16  SpO2  Av %  Min: 98 %  Max: 98 %  Height  Av' 4" (162.6 cm)  Min: 5' 4" (162.6 cm)  Max: 5' 4" (162.6 cm)  Weight  Av.8 kg (145 lb)  Min: 65.8 kg (145 lb)  Max: 65.8 kg (145 lb)  Body mass index is 24.89 kg/m².  No intake/output data recorded.  Last   Physical Examination:    /70 (BP Location: Right arm, Patient Position: Lying)   Pulse (!) 53   Temp 98.2 °F (36.8 °C) (Oral)   Resp 16   Ht 5' 4" (1.626 m)   Wt 65.8 kg (145 lb)   LMP 10/18/2017 (Exact Date) Comment: UPT negative  SpO2 98%   Breastfeeding? No   BMI 24.89 kg/m²     General Appearance:    Alert, cooperative, no distress, appears stated age   Head:    Normocephalic, without obvious abnormality, atraumatic   Back:     Symmetric, no curvature, ROM normal, no CVA tenderness   Lungs:     Clear to auscultation bilaterally, respirations unlabored   Chest Wall:    No tenderness or deformity    Heart:    Regular rate and rhythm, S1 and S2 normal, no murmur, rub   or gallop   Abdomen:     Soft, non-tender, bowel sounds active all four quadrants,     no masses, no organomegaly, small umbilical hernia palpated superior to umbilicus   Extremities:   Extremities normal, atraumatic, no cyanosis or edema   Pulses:   2+ and symmetric all extremities   Skin:   Skin color, texture, turgor normal, no rashes or lesions   Neurologic:   CNII-XII intact, normal strength, sensation and reflexes     throughout     Laboratory:  Most Recent Data:  CBC: Lab Results   Component Value Date    WBC 6.98 10/27/2017    HGB 13.3 10/27/2017    HCT 41.1 10/27/2017     10/27/2017    MCV 86 10/27/2017    RDW 12.0 10/27/2017 "     BMP: Lab Results   Component Value Date     10/27/2017    K 4.2 10/27/2017     10/27/2017    CO2 30 (H) 10/27/2017    BUN 17 10/27/2017    CREATININE 0.7 10/27/2017    GLU 79 10/27/2017    CALCIUM 9.1 10/27/2017     LFTs: Lab Results   Component Value Date    PROT 6.7 05/07/2017    ALBUMIN 3.4 (L) 05/07/2017    BILITOT 0.3 05/07/2017    AST 19 05/07/2017    ALKPHOS 72 05/07/2017    ALT 22 05/07/2017     DM: Lab Results   Component Value Date    CREATININE 0.7 10/27/2017     Urinalysis: Lab Results   Component Value Date    LABURIN No growth 10/19/2016    COLORU Yellow 05/07/2017    SPECGRAV 1.025 05/07/2017    NITRITE Negative 05/07/2017    KETONESU Negative 05/07/2017    UROBILINOGEN Negative 05/07/2017       Trended Lab Data:    Recent Labs  Lab 10/27/17  1207   WBC 6.98   HGB 13.3   HCT 41.1      MCV 86   RDW 12.0      K 4.2      CO2 30*   BUN 17   CREATININE 0.7   GLU 79        Assessment:     Vani Hernandez is a 32 y.o. female with:  Patient Active Problem List    Diagnosis Date Noted    Umbilical hernia without obstruction and without gangrene 10/19/2017    Mild intermittent asthma without complication 10/19/2017    Periumbilical abdominal pain 10/19/2017        Plan:     Vani Hernandez is a 32 y.o. female w/ a h/o asthma, GERD-like symptoms with fat-filled umbilical hernias on CT scan.    Risks and benefits discussed, including the chance that abdominal pain and nausea/vomiting won't be cured with this procedure. Her nausea/vomiting could also be related to her marijuana smoking.     Consents signed.  To OR for elective umbilical hernia repair today.    Fredi Patel, HO1   [facility]

## 2017-10-30 NOTE — PLAN OF CARE
Pt has met pacu criteria, VSS, pain still 6-10, pt becoming more drowsy and sedated and sleeping for longer intervals, no surgical site complications, dsg CDI. REleased per Dr. Rubin

## 2017-10-30 NOTE — PLAN OF CARE
Received from PACU per stretcher. Assisted up to BR to void without difficulty. C/o pain. Will medicate po. VSS. See flow sheets, Ice pack to abdomen.

## 2017-10-30 NOTE — OP NOTE
DATE OF PROCEDURE:  10/30/2017    PREOPERATIVE DIAGNOSES:  Umbilical hernia and supraumbilical hernia.    POSTOPERATIVE DIAGNOSES:  Umbilical hernia and supraumbilical hernia.    OPERATION:  Repair of umbilical and supraumbilical hernia.    SURGEON:  Mellisa Loera M.D.    ASSISTANT:  Dr. Smith Ricks.    ANESTHESIA:  General.    DESCRIPTION OF PROCEDURE:  After satisfactory general endotracheal anesthesia,   the patient in supine position, abdomen was prepped and draped in normal sterile   manner using ChloraPrep.  A timeout was called.  The patient was properly   recognized, the area was confirmed.  Small incision was made at the base of the   umbilicus, was taken down to deep subcutaneous tissue.  Subcutaneous clamped and   bovied, further taken down.  The umbilical skin was  from the   umbilical hernia and further dissection upper part of the incision.  The fat   umbilical hernia was completely isolated with its entrance to the abdominal   wall.  Excess hernia sac was excised.  The area was ligated using 2-0 silk   ligatures.  Then, repair of the hernia was accomplished using interrupted #1   Prolene suture, vest-over-pants type of repair.  Umbilical hernia was excised   and the area closed using interrupted #1 Prolene.  Subcutaneous tissue   approximated with 3-0 Vicryl, skin was closed with running 4-0 Monocryl.    Incision was infiltrated using 0.25% Marcaine solution.  Sterile gauze dressing   was applied.  Pressure bandage was applied.  The instrument count, sponge count   and needle count was correct.  The specimen was both umbilical and paraumbilical   hernia.  The patient was sent to Recovery Room in stable condition.      MS/IN  dd: 10/30/2017 10:28:23 (CDT)  td: 10/30/2017 11:11:02 (CDT)  Doc ID   #[id number]  Job ID #[id number]    CC:

## 2017-10-30 NOTE — PLAN OF CARE
Reports feeling better. Pain now 2/10. Meets criteria for discharge. IV discontinued with tip intact. Tolerating po fluids without nausea. VSS. Discharge teaching done, time allowed for questions. Verbalizes understanding.

## 2017-10-30 NOTE — PLAN OF CARE
Recd to pacu from OR with CRNA and Rn, connected to monitors and pt assessed, Report rec'd. See flowsheets for VS and assessments.

## 2017-10-30 NOTE — TRANSFER OF CARE
"Anesthesia Transfer of Care Note    Patient: Vani Hernandez    Procedure(s) Performed: Procedure(s) (LRB):  REPAIR-HERNIA-UMBILICAL (5 YRS +) (N/A)    Patient location: PACU    Anesthesia Type: general    Transport from OR: Transported from OR on 6-10 L/min O2 by face mask with adequate spontaneous ventilation    Post pain: adequate analgesia    Post assessment: no apparent anesthetic complications and tolerated procedure well    Post vital signs: stable    Level of consciousness: awake, alert and oriented    Nausea/Vomiting: no nausea/vomiting    Complications: none    Transfer of care protocol was followed      Last vitals:   Visit Vitals  BP (!) 149/93   Pulse 62   Temp 36.4 °C (97.5 °F) (Skin)   Resp 16   Ht 5' 4" (1.626 m)   Wt 65.8 kg (145 lb)   LMP 10/18/2017 (Exact Date)   SpO2 96%   Breastfeeding? No   BMI 24.89 kg/m²     "

## 2017-10-30 NOTE — OR NURSING
Pt stated that she did not wanted anyone called concerning her surgery or to update. SANTA Martin RN

## 2017-10-30 NOTE — BRIEF OP NOTE
Date of Procedure:  10/30/2017    Procedure:   Open umbilical hernia repair    Surgeon(s) and Role:  * Dr. Mellisa Loera    Assisting Surgeon: Fredi Patel    Pre-Operative Diagnosis: Umbilical hernia     Post-Operative Diagnosis: Umbilical hernia    Anesthesia: General    Technical Procedures Used: Open    Complications: None; patient tolerated the procedure well.    Estimated Blood Loss (EBL): Minimal    Drains: none    Implants: none    Specimens: Hernia    Condition: stable    Disposition: PACU - hemodynamically stable.    Operative Note dictated

## 2017-10-30 NOTE — ANESTHESIA POSTPROCEDURE EVALUATION
"Anesthesia Post Evaluation    Patient: Vani Hernandez    Procedure(s) Performed: Procedure(s) (LRB):  REPAIR-HERNIA-UMBILICAL (5 YRS +) (N/A)    Final Anesthesia Type: general  Patient location during evaluation: PACU  Patient participation: Yes- Able to Participate  Level of consciousness: awake and alert, oriented and awake  Post-procedure vital signs: reviewed and stable  Pain management: adequate  Airway patency: patent  PONV status at discharge: No PONV  Anesthetic complications: no      Cardiovascular status: blood pressure returned to baseline  Respiratory status: unassisted and room air  Hydration status: euvolemic  Follow-up not needed.        Visit Vitals  BP (!) 140/78   Pulse 66   Temp 36.4 °C (97.5 °F) (Skin)   Resp 18   Ht 5' 4" (1.626 m)   Wt 65.8 kg (145 lb)   LMP 10/18/2017 (Exact Date)   SpO2 98%   Breastfeeding? No   BMI 24.89 kg/m²       Pain/Ty Score: Pain Assessment Performed: Yes (10/30/2017 10:30 AM)  Presence of Pain: non-verbal indicators present (10/30/2017 10:30 AM)  Pain Rating Prior to Med Admin: 8 (10/30/2017 11:10 AM)  Ty Score: 10 (10/30/2017 10:30 AM)      "

## 2017-10-30 NOTE — DISCHARGE INSTRUCTIONS
After Hernia Surgery    You can usually go home the same day as surgery. If you had surgery to repair ventral or incisional hernia, you may need to stay in the hospital overnight. To speed healing, take an active role in your recovery. The tips below can help.  Reducing swelling  Early on, its common for the area around your incision to be swollen, bruised, and sore. To reduce swelling, put an ice pack or bag of frozen peas in a thin towel. Place the towel on the swollen area 3 to 5 times a day for 15 to 20 minutes at a time.  Managing pain  Take any prescribed pain medicines as directed. Be aware that some pain medicines can cause constipation. So your healthcare provider may also suggest a laxative or stool softener.  Returning to normal  You can return to your normal routine as soon as you feel able. Just take it easy and follow these guidelines:  · Take short walks to improve circulation.  · Avoid heavy lifting for at least a week.  · Ask your healthcare provider when you can drive and return to work.  · You can have sex again when you feel ready.  Follow-up care  Be sure to keep all follow-up appointments with your healthcare provider. These ensure youre healing well. During visits, your stitches, staples, or bandage may be removed.  When to call your healthcare provider   Call your healthcare provider if you have any of the following:  · A large amount of swelling or bruising (some testicular swelling and bruising is normal)  · Fever of 100.4°F (38°C) or higher, or as directed by your healthcare provider  · Pain, redness, bleeding, or fluid from the incision that gets worse  · Trouble urinating  · Constipation  · Vomiting   Date Last Reviewed: 10/1/2016  © 7995-6963 [employer]. [address] [place] [address], [place] [address]. All rights reserved. This information is not intended as a substitute for professional medical care. Always follow your healthcare professional's instructions.

## 2017-10-31 VITALS
HEART RATE: 57 BPM | BODY MASS INDEX: 24.75 KG/M2 | OXYGEN SATURATION: 96 % | TEMPERATURE: 98 F | HEIGHT: 64 IN | DIASTOLIC BLOOD PRESSURE: 60 MMHG | WEIGHT: 145 LBS | SYSTOLIC BLOOD PRESSURE: 119 MMHG | RESPIRATION RATE: 18 BRPM

## 2017-10-31 PROBLEM — R10.33 PERIUMBILICAL ABDOMINAL PAIN: Status: RESOLVED | Noted: 2017-10-19 | Resolved: 2017-10-31

## 2017-10-31 NOTE — DISCHARGE SUMMARY
[facility]  General Surgery  Out-Patient Discharge Summary      Patient Name: Vani Hernandez  MRN: [id number]  Admission Date: 10/30/2017  Hospital Length of Stay: 0 days  Discharge Date and Time:  10/30/2017 12:30 AM  Attending Physician: Mellisa Loera  Discharging Provider: Fredi Patel MD      HPI:   Vani Hernandez is a 32 y.o. F w/ a PMHx asthma, GERD-like symptoms who presents for elective umbilical hernia. No prior abdominal surgeries. States it has been present for as long as she can remember but has been bothering her for 2 months. CT scan in May revealed 2 fat-filled umbilical hernias.  CT scan was obtained because of abdominal pain.  She is having some intermittent nausea, vomiting and constipation. Emesis is non-bilious, non-bloody and color of food. She does smoke marijuana semi-regularly.    Procedure(s) (LRB):  REPAIR-HERNIA-UMBILICAL (5 YRS +) (N/A)     Hospital Course: Pt was admitted for out-patient umbilical hernia repair and went to OR. Pt tolerated procedure without difficulty and was observed by PACU.  She tolerated PO, urinated without difficulty and pain was controlled.  She was sent home with return precautions, pain medications and follow-up appt with Dr. Loera and precautions regarding lifting and signs of infection.    Significant Diagnostic Studies: CT scan performed in May showing 2 fat-containing periumbilical hernias    Final Active Diagnoses:    Diagnosis Date Noted POA    PRINCIPAL PROBLEM:  Umbilical hernia without obstruction and without gangrene [K42.9] 10/19/2017 Yes      Problems Resolved During this Admission:    Diagnosis Date Noted Date Resolved POA      Discharged Condition: stable    Disposition: Home or Self Care    Follow Up:  Follow-up Information     MELLISA LOERA M.D. In 1 week.    Why:  For wound re-check, For suture removal  Contact information:  [address] [place] [id number] [phone]               Patient  Instructions:     Diet general     Activity as tolerated     Keep surgical extremity elevated     Lifting restrictions     Call MD for:  temperature >100.4     Call MD for:  persistent nausea and vomiting     Call MD for:  severe uncontrolled pain     Call MD for:  redness, tenderness, or signs of infection (pain, swelling, redness, odor or green/yellow discharge around incision site)     Leave dressing on - Keep it clean, dry, and intact until clinic visit       Medications:  Reconciled Home Medications:   Discharge Medication List as of 10/30/2017 11:48 AM      START taking these medications    Details   hydrocodone-acetaminophen 5-325mg (NORCO) 5-325 mg per tablet Take 1 tablet by mouth every 6 (six) hours as needed for Pain., Starting Mon 10/30/2017, Print         CONTINUE these medications which have NOT CHANGED    Details   albuterol 90 mcg/actuation inhaler Inhale 1-2 puffs into the lungs every 6 (six) hours as needed for Wheezing. Rescue, Starting Wed 7/26/2017, Until Thu 7/26/2018, Normal      mometasone-formoterol 100-5 mcg/actuation HFAA Controller, Normal      UNKNOWN TO PATIENT Until Discontinued, Historical Med             Fredi Patel MD  General Surgery  [facility]

## 2017-11-14 ENCOUNTER — HOSPITAL ENCOUNTER (EMERGENCY)
Facility: HOSPITAL | Age: 32
Discharge: LEFT BEFORE DISCHARGE | End: 2017-11-14
Attending: EMERGENCY MEDICINE
Payer: MEDICAID

## 2017-11-14 VITALS
HEIGHT: 64 IN | RESPIRATION RATE: 16 BRPM | TEMPERATURE: 99 F | BODY MASS INDEX: 23.9 KG/M2 | HEART RATE: 63 BPM | OXYGEN SATURATION: 100 % | DIASTOLIC BLOOD PRESSURE: 56 MMHG | SYSTOLIC BLOOD PRESSURE: 107 MMHG | WEIGHT: 140 LBS

## 2017-11-14 DIAGNOSIS — K21.9 GASTROESOPHAGEAL REFLUX DISEASE, ESOPHAGITIS PRESENCE NOT SPECIFIED: Primary | ICD-10-CM

## 2017-11-14 LAB
ALBUMIN SERPL BCP-MCNC: 4.1 G/DL
ALP SERPL-CCNC: 90 U/L
ALT SERPL W/O P-5'-P-CCNC: 15 U/L
ANION GAP SERPL CALC-SCNC: 7 MMOL/L
AST SERPL-CCNC: 15 U/L
B-HCG UR QL: NEGATIVE
BASOPHILS # BLD AUTO: 0.07 K/UL
BASOPHILS NFR BLD: 0.6 %
BILIRUB SERPL-MCNC: 0.8 MG/DL
BILIRUB UR QL STRIP: NEGATIVE
BUN SERPL-MCNC: 20 MG/DL
CALCIUM SERPL-MCNC: 9.6 MG/DL
CHLORIDE SERPL-SCNC: 104 MMOL/L
CLARITY UR REFRACT.AUTO: CLEAR
CO2 SERPL-SCNC: 28 MMOL/L
COLOR UR AUTO: YELLOW
CREAT SERPL-MCNC: 0.8 MG/DL
CTP QC/QA: YES
DIFFERENTIAL METHOD: NORMAL
EOSINOPHIL # BLD AUTO: 0.2 K/UL
EOSINOPHIL NFR BLD: 1.6 %
ERYTHROCYTE [DISTWIDTH] IN BLOOD BY AUTOMATED COUNT: 11.9 %
EST. GFR  (AFRICAN AMERICAN): >60 ML/MIN/1.73 M^2
EST. GFR  (NON AFRICAN AMERICAN): >60 ML/MIN/1.73 M^2
GLUCOSE SERPL-MCNC: 93 MG/DL
GLUCOSE UR QL STRIP: NEGATIVE
HCT VFR BLD AUTO: 40.9 %
HGB BLD-MCNC: 13.3 G/DL
HGB UR QL STRIP: NEGATIVE
IMM GRANULOCYTES # BLD AUTO: 0.03 K/UL
IMM GRANULOCYTES NFR BLD AUTO: 0.3 %
KETONES UR QL STRIP: NEGATIVE
LEUKOCYTE ESTERASE UR QL STRIP: NEGATIVE
LIPASE SERPL-CCNC: 60 U/L
LYMPHOCYTES # BLD AUTO: 4.7 K/UL
LYMPHOCYTES NFR BLD: 40.4 %
MCH RBC QN AUTO: 27.6 PG
MCHC RBC AUTO-ENTMCNC: 32.5 G/DL
MCV RBC AUTO: 85 FL
MONOCYTES # BLD AUTO: 0.9 K/UL
MONOCYTES NFR BLD: 7.8 %
NEUTROPHILS # BLD AUTO: 5.7 K/UL
NEUTROPHILS NFR BLD: 49.3 %
NITRITE UR QL STRIP: NEGATIVE
NRBC BLD-RTO: 0 /100 WBC
PH UR STRIP: 5 [PH] (ref 5–8)
PLATELET # BLD AUTO: 276 K/UL
PMV BLD AUTO: 10.2 FL
POTASSIUM SERPL-SCNC: 4.3 MMOL/L
PROT SERPL-MCNC: 7.9 G/DL
PROT UR QL STRIP: NEGATIVE
RBC # BLD AUTO: 4.82 M/UL
SODIUM SERPL-SCNC: 139 MMOL/L
SP GR UR STRIP: 1.02 (ref 1–1.03)
URN SPEC COLLECT METH UR: NORMAL
UROBILINOGEN UR STRIP-ACNC: NEGATIVE EU/DL
WBC # BLD AUTO: 11.64 K/UL

## 2017-11-14 PROCEDURE — 81025 URINE PREGNANCY TEST: CPT | Performed by: EMERGENCY MEDICINE

## 2017-11-14 PROCEDURE — 81003 URINALYSIS AUTO W/O SCOPE: CPT

## 2017-11-14 PROCEDURE — 85025 COMPLETE CBC W/AUTO DIFF WBC: CPT

## 2017-11-14 PROCEDURE — 80053 COMPREHEN METABOLIC PANEL: CPT

## 2017-11-14 PROCEDURE — 99284 EMERGENCY DEPT VISIT MOD MDM: CPT | Mod: ,,,

## 2017-11-14 PROCEDURE — 99283 EMERGENCY DEPT VISIT LOW MDM: CPT | Mod: 25

## 2017-11-14 PROCEDURE — 83690 ASSAY OF LIPASE: CPT

## 2017-11-14 RX ORDER — ONDANSETRON 2 MG/ML
4 INJECTION INTRAMUSCULAR; INTRAVENOUS
Status: DISCONTINUED | OUTPATIENT
Start: 2017-11-14 | End: 2017-11-14 | Stop reason: HOSPADM

## 2017-11-14 RX ORDER — FAMOTIDINE 10 MG/ML
20 INJECTION INTRAVENOUS
Status: DISCONTINUED | OUTPATIENT
Start: 2017-11-14 | End: 2017-11-14 | Stop reason: HOSPADM

## 2017-11-15 ENCOUNTER — HOSPITAL ENCOUNTER (EMERGENCY)
Facility: HOSPITAL | Age: 32
Discharge: HOME | End: 2017-11-15
Attending: EMERGENCY MEDICINE
Payer: MEDICAID

## 2017-11-15 VITALS
RESPIRATION RATE: 16 BRPM | DIASTOLIC BLOOD PRESSURE: 87 MMHG | BODY MASS INDEX: 23.9 KG/M2 | TEMPERATURE: 98 F | HEIGHT: 64 IN | OXYGEN SATURATION: 99 % | HEART RATE: 68 BPM | WEIGHT: 140 LBS | SYSTOLIC BLOOD PRESSURE: 132 MMHG

## 2017-11-15 DIAGNOSIS — R11.2 NON-INTRACTABLE VOMITING WITH NAUSEA, UNSPECIFIED VOMITING TYPE: ICD-10-CM

## 2017-11-15 DIAGNOSIS — R10.9 ABDOMINAL PAIN, UNSPECIFIED ABDOMINAL LOCATION: Primary | ICD-10-CM

## 2017-11-15 PROCEDURE — 99284 EMERGENCY DEPT VISIT MOD MDM: CPT | Mod: ,,, | Performed by: PHYSICIAN ASSISTANT

## 2017-11-15 PROCEDURE — 99283 EMERGENCY DEPT VISIT LOW MDM: CPT

## 2017-11-15 RX ORDER — OMEPRAZOLE 20 MG/1
20 CAPSULE, DELAYED RELEASE ORAL DAILY
Qty: 14 CAPSULE | Refills: 0 | Status: SHIPPED | OUTPATIENT
Start: 2017-11-15 | End: 2019-04-26

## 2017-11-15 RX ORDER — SUCRALFATE 1 G/1
1 TABLET ORAL 4 TIMES DAILY
Qty: 20 TABLET | Refills: 0 | Status: SHIPPED | OUTPATIENT
Start: 2017-11-15 | End: 2019-04-26

## 2017-11-15 RX ORDER — ONDANSETRON 4 MG/1
4 TABLET, ORALLY DISINTEGRATING ORAL EVERY 8 HOURS PRN
Qty: 12 TABLET | Refills: 0 | Status: SHIPPED | OUTPATIENT
Start: 2017-11-15 | End: 2019-04-26 | Stop reason: ALTCHOICE

## 2017-11-15 NOTE — ED NOTES
Pt insisted IV be removed so she can leave and get something to eat. Pt denies need for nausea medication. Pt advised to follow up with her surgeon tomorrow.

## 2017-11-15 NOTE — ED TRIAGE NOTES
Presents to ER with nausea and vomiting off and on for 2 weeks.  States that she had an umbilical hernia repaired 2 weeks.    GENERAL: The patient is well-developed and well-nourished in no apparent distress. Alert and oriented x4.                                                HEENT: Head is normocephalic and atraumatic. Extraocular muscles are intact. Pupils are equal, round, and reactive to light and accommodation. Nares appeared normal. Mouth is well hydrated and without lesions. Mucous membranes are moist. Posterior pharynx clear of any exudate or lesions.    NECK: Supple. No carotid bruits. No lymphadenopathy or thyromegaly.    LUNGS: Clear to auscultation.    HEART: Regular rate and rhythm without murmur.     ABDOMEN: Soft, nontender, and nondistended. Positive bowel sounds. No hepatosplenomegaly was noted.     EXTREMITIES: Without any cyanosis, clubbing, rash, lesions or edema.     NEUROLOGIC: Cranial nerves II through XII are grossly intact.     PSYCHIATRIC: Flat affect, but denies suicidal or homicidal ideations.    SKIN: No ulceration or induration present.

## 2017-11-15 NOTE — ED NOTES
"Pt stated "was here yesterday and they did blood work and gave me medicine thru IV but I didn't stay fopr the RXs cause had my son with me so I dont want anything done but a rx". Pt denies abd pain, burning stated "I dont feel anything today but as soon as I eat I know I will start hurting"  "

## 2017-11-15 NOTE — ED PROVIDER NOTES
Encounter Date: 11/14/2017       History     Chief Complaint   Patient presents with    Post-op Problem     had surg for umbilical hernia 2 weeks ago , vomiting     32 y.o. female with medical history of GERD and s/p umbilical hernia repair presents to ED with nausea, vomiting and abdominal pain.  Patient states symptoms have been present x 1.5 weeks. Abdominal pain located to epigastric region.  Patient also endorses heartburn that is worsened with laying down and after eating.  Patient has not tried anything at home to relieve pain.  Patient denies umbilical pain, drainage from incision site.  Patient denies fever, chills, chest pain, shortness of breath, weakness, syncope, changes in bowel, changes in urination, cough, congestion.          Review of patient's allergies indicates:  No Known Allergies  Past Medical History:   Diagnosis Date    Asthma      Past Surgical History:   Procedure Laterality Date    HERNIA REPAIR       Family History   Problem Relation Age of Onset    Diabetes Mother     No Known Problems Father      Social History   Substance Use Topics    Smoking status: Former Smoker    Smokeless tobacco: Never Used    Alcohol use No     Review of Systems   Constitutional: Negative for chills, diaphoresis, fatigue and fever.   HENT: Negative for congestion and sore throat.    Eyes: Negative for visual disturbance.   Respiratory: Negative for cough and shortness of breath.    Cardiovascular: Negative for chest pain.   Gastrointestinal: Negative for abdominal pain, nausea and vomiting.   Genitourinary: Negative for dysuria.   Musculoskeletal: Negative for back pain, myalgias and neck pain.   Skin: Negative for rash.   Neurological: Negative for weakness.   Hematological: Does not bruise/bleed easily.   Psychiatric/Behavioral: The patient is not nervous/anxious.        Physical Exam     Initial Vitals [11/14/17 1641]   BP Pulse Resp Temp SpO2   (!) 107/56 63 16 98.5 °F (36.9 °C) 100 %      MAP        73         Physical Exam    Vitals reviewed.  Constitutional: Vital signs are normal. She appears well-developed and well-nourished. She is not diaphoretic. No distress.   HENT:   Head: Normocephalic and atraumatic.   Nose: Nose normal.   Mouth/Throat: Oropharynx is clear and moist.   Eyes: Conjunctivae, EOM and lids are normal. Pupils are equal, round, and reactive to light. Lids are everted and swept, no foreign bodies found.   Neck: Trachea normal and normal range of motion. Neck supple.   Cardiovascular: Normal rate, regular rhythm, intact distal pulses and normal pulses.   Pulmonary/Chest: Breath sounds normal. She has no wheezes. She has no rhonchi. She has no rales.   Abdominal: Soft. Normal appearance and bowel sounds are normal. There is no tenderness. There is no rebound and no guarding.   Musculoskeletal: She exhibits no edema or tenderness.   Neurological: She is alert and oriented to person, place, and time. She has normal strength. No cranial nerve deficit or sensory deficit.   Skin: Skin is warm. Capillary refill takes less than 2 seconds. No rash noted. No cyanosis.   Psychiatric: She has a normal mood and affect.         ED Course   Procedures  Labs Reviewed   COMPREHENSIVE METABOLIC PANEL - Abnormal; Notable for the following:        Result Value    Anion Gap 7 (*)     All other components within normal limits   CBC W/ AUTO DIFFERENTIAL   LIPASE   URINALYSIS, REFLEX TO URINE CULTURE    Narrative:     Preferred Collection Type->Urine, Clean Catch   POCT URINE PREGNANCY                   APC / Resident Notes:   32 y.o. female with medical history of GERD and s/p umbilical hernia repair presents to ED with nausea and vomiting.     DDX includes but is not limited to acid reflux, pancreatitis, electrolyte abnormality, gastritis.  Will get labs and gave GI cocktail, IV Zofran 4 mg and IV Pepcid 20 mg and reassess. Labs benign. When I went to reassess patient, the nurses informed me that she had eloped.      I have discussed and reviewed with my supervising physician.         Attending Attestation:     Physician Attestation Statement for NP/PA:   I discussed this assessment and plan of this patient with the NP/PA, but I did not personally examine the patient. The face to face encounter was performed by the NP/PA.                  ED Course      Clinical Impression:   The encounter diagnosis was Gastroesophageal reflux disease, esophagitis presence not specified.    Disposition:   Disposition: Raúl Lion PA-C  11/14/17 5169       Arturo Bryant MD  12/02/17 6711

## 2019-02-20 ENCOUNTER — HOSPITAL ENCOUNTER (EMERGENCY)
Facility: HOSPITAL | Age: 34
Discharge: HOME | End: 2019-02-20
Attending: EMERGENCY MEDICINE
Payer: MEDICAID

## 2019-02-20 VITALS
WEIGHT: 140 LBS | BODY MASS INDEX: 23.9 KG/M2 | DIASTOLIC BLOOD PRESSURE: 83 MMHG | RESPIRATION RATE: 20 BRPM | HEART RATE: 86 BPM | HEIGHT: 64 IN | OXYGEN SATURATION: 95 % | TEMPERATURE: 98 F | SYSTOLIC BLOOD PRESSURE: 154 MMHG

## 2019-02-20 DIAGNOSIS — Z20.2 POSSIBLE EXPOSURE TO STD: Primary | ICD-10-CM

## 2019-02-20 LAB
B-HCG UR QL: NEGATIVE
C TRACH DNA SPEC QL NAA+PROBE: NOT DETECTED
CTP QC/QA: YES
N GONORRHOEA DNA SPEC QL NAA+PROBE: NOT DETECTED

## 2019-02-20 PROCEDURE — 87491 CHLMYD TRACH DNA AMP PROBE: CPT

## 2019-02-20 PROCEDURE — 99284 EMERGENCY DEPT VISIT MOD MDM: CPT | Mod: 25

## 2019-02-20 PROCEDURE — 63600175 PHARM REV CODE 636 W HCPCS: Performed by: NURSE PRACTITIONER

## 2019-02-20 PROCEDURE — 96372 THER/PROPH/DIAG INJ SC/IM: CPT

## 2019-02-20 PROCEDURE — 25000003 PHARM REV CODE 250: Performed by: NURSE PRACTITIONER

## 2019-02-20 PROCEDURE — 81025 URINE PREGNANCY TEST: CPT | Performed by: NURSE PRACTITIONER

## 2019-02-20 RX ORDER — AZITHROMYCIN 250 MG/1
1000 TABLET, FILM COATED ORAL
Status: COMPLETED | OUTPATIENT
Start: 2019-02-20 | End: 2019-02-20

## 2019-02-20 RX ORDER — CEFTRIAXONE 250 MG/1
250 INJECTION, POWDER, FOR SOLUTION INTRAMUSCULAR; INTRAVENOUS
Status: COMPLETED | OUTPATIENT
Start: 2019-02-20 | End: 2019-02-20

## 2019-02-20 RX ADMIN — CEFTRIAXONE SODIUM 250 MG: 250 INJECTION, POWDER, FOR SOLUTION INTRAMUSCULAR; INTRAVENOUS at 08:02

## 2019-02-20 RX ADMIN — AZITHROMYCIN 1000 MG: 250 TABLET, FILM COATED ORAL at 08:02

## 2019-02-20 NOTE — DISCHARGE INSTRUCTIONS
No sex until cleared by your doctor.  Go to the [facility] today for further testing. Your partner also needs testing and treatment.  Return to the ED if your condition changes, progresses, or if you have any concerns.

## 2019-02-20 NOTE — ED NOTES
Pt provided with gown and instructed to change into it and remove all metal. Pt verbalized understanding.

## 2019-02-20 NOTE — ED PROVIDER NOTES
Encounter Date: 2/20/2019       History     Chief Complaint   Patient presents with    Female  Problem     Pt requesting STD testing. Previous partner contacted pt stating was + for something. Pt also very anxious stating feels like she is going to have panic attack.      33-year-old female presents the ED for STD check.  She reports that she was notified by a partner that she was exposed to an STD, although she does not know which STD.  The patient denies symptoms including abdominal pain, fever/chills, vaginal bleeding, and discharge. She reports that she had chlamydia multiple years ago, but not since.  She reports that she is anxious about possibly having an STD.  No other complaints at this time.      The history is provided by the patient.     Review of patient's allergies indicates:  No Known Allergies  Past Medical History:   Diagnosis Date    Asthma     GERD (gastroesophageal reflux disease)      Past Surgical History:   Procedure Laterality Date    HERNIA REPAIR      REPAIR-HERNIA-UMBILICAL (5 YRS +) N/A 10/30/2017    Performed by Mellisa Loera MD at [facility] OR     Family History   Problem Relation Age of Onset    Diabetes Mother     No Known Problems Father      Social History     Tobacco Use    Smoking status: Former Smoker    Smokeless tobacco: Never Used   Substance Use Topics    Alcohol use: No    Drug use: No     Review of Systems   Constitutional: Negative for chills and fever.   HENT: Negative for congestion.    Respiratory: Positive for cough (occasional dry).    Gastrointestinal: Negative for abdominal pain, diarrhea and vomiting.   Genitourinary: Negative for dysuria, vaginal bleeding and vaginal discharge.   Musculoskeletal: Negative for back pain.   Skin: Negative for rash.   Allergic/Immunologic: Negative for immunocompromised state.   Neurological: Negative for headaches.   Psychiatric/Behavioral: Negative for confusion. The patient is nervous/anxious.    All other systems  reviewed and are negative.      Physical Exam     Initial Vitals [02/20/19 0815]   BP Pulse Resp Temp SpO2   (!) 132/93 76 20 99.1 °F (37.3 °C) 100 %      MAP       --         Physical Exam    Nursing note and vitals reviewed.  Constitutional: Vital signs are normal. She appears well-developed and well-nourished. She is active and cooperative. She is easily aroused.  Non-toxic appearance. She does not have a sickly appearance. She does not appear ill. No distress.   HENT:   Head: Normocephalic and atraumatic.   Nose: Nose normal.   Mouth/Throat: Oropharynx is clear and moist.   Eyes: Conjunctivae and EOM are normal.   Neck: Normal range of motion. Neck supple.   Cardiovascular: Normal rate, regular rhythm and normal heart sounds.   Pulmonary/Chest: Effort normal and breath sounds normal.   Abdominal: Soft. Normal appearance and bowel sounds are normal. She exhibits no distension. There is no tenderness. There is no rigidity, no rebound, no guarding and no CVA tenderness.   Genitourinary:   Genitourinary Comments: Exam deferred   Neurological: She is alert, oriented to person, place, and time and easily aroused. She has normal strength. GCS eye subscore is 4. GCS verbal subscore is 5. GCS motor subscore is 6.   Skin: Skin is warm, dry and intact. No bruising and no rash noted.   Psychiatric: Her speech is normal and behavior is normal. Judgment and thought content normal. Her mood appears anxious. Cognition and memory are normal.         ED Course   Procedures  Labs Reviewed   C. TRACHOMATIS/N. GONORRHOEAE BY AMP DNA   POCT URINE PREGNANCY          Imaging Results    None          Medical Decision Making:   Initial Assessment:   33-year-old female presents the ED for an STD check.  She was notified by a partner that she was possibly exposed to an STD, although she is not sure which one.  She denies symptoms. No fever/chills, abdominal pain, vaginal bleeding/discharge, or rash. The patient appears well, nontoxic.  She  is anxious.  Abdomen soft, nontender.  Differential Diagnosis:   Concern for STD  Clinical Tests:   Lab Tests: Ordered and Reviewed  ED Management:  Labs, Rocephin, and Zithromax  Patient treated empirically for GC.  No indication for pelvic exam or imaging at this time.  I do not suspect PID as the patient is generally well appearing, afebrile, and has no abdominal pain or tenderness. I encouraged abstinence until cleared by her doctor.  She was given information about the [facility] clinic in [place].  I reviewed safe sex practices.  I advised testing and treatment of her partner.  The patient verbalized understanding, compliance, and agreement with the treatment plan.                      Clinical Impression:   The encounter diagnosis was Possible exposure to STD.                             Kate Chris, ARTURO  02/20/19 0884

## 2019-04-26 ENCOUNTER — OFFICE VISIT (OUTPATIENT)
Dept: OBSTETRICS AND GYNECOLOGY | Facility: CLINIC | Age: 34
End: 2019-04-26
Payer: MEDICAID

## 2019-04-26 VITALS
DIASTOLIC BLOOD PRESSURE: 64 MMHG | WEIGHT: 134.81 LBS | HEIGHT: 64 IN | SYSTOLIC BLOOD PRESSURE: 106 MMHG | BODY MASS INDEX: 23.02 KG/M2

## 2019-04-26 DIAGNOSIS — N91.2 AMENORRHEA: Primary | ICD-10-CM

## 2019-04-26 DIAGNOSIS — Z78.9 DATE OF LAST MENSTRUAL PERIOD (LMP) UNKNOWN: ICD-10-CM

## 2019-04-26 LAB
B-HCG UR QL: POSITIVE
CTP QC/QA: YES

## 2019-04-26 PROCEDURE — 99203 PR OFFICE/OUTPT VISIT, NEW, LEVL III, 30-44 MIN: ICD-10-PCS | Mod: S$PBB,TH,, | Performed by: OBSTETRICS & GYNECOLOGY

## 2019-04-26 PROCEDURE — 81025 URINE PREGNANCY TEST: CPT | Mod: PBBFAC,PO | Performed by: OBSTETRICS & GYNECOLOGY

## 2019-04-26 PROCEDURE — 99203 OFFICE O/P NEW LOW 30 MIN: CPT | Mod: S$PBB,TH,, | Performed by: OBSTETRICS & GYNECOLOGY

## 2019-04-26 PROCEDURE — 87186 SC STD MICRODIL/AGAR DIL: CPT

## 2019-04-26 PROCEDURE — 99999 PR PBB SHADOW E&M-EST. PATIENT-LVL III: ICD-10-PCS | Mod: PBBFAC,,, | Performed by: OBSTETRICS & GYNECOLOGY

## 2019-04-26 PROCEDURE — 87088 URINE BACTERIA CULTURE: CPT

## 2019-04-26 PROCEDURE — 87491 CHLMYD TRACH DNA AMP PROBE: CPT

## 2019-04-26 PROCEDURE — 87086 URINE CULTURE/COLONY COUNT: CPT

## 2019-04-26 PROCEDURE — 87077 CULTURE AEROBIC IDENTIFY: CPT

## 2019-04-26 PROCEDURE — 99999 PR PBB SHADOW E&M-EST. PATIENT-LVL III: CPT | Mod: PBBFAC,,, | Performed by: OBSTETRICS & GYNECOLOGY

## 2019-04-26 PROCEDURE — 99213 OFFICE O/P EST LOW 20 MIN: CPT | Mod: PBBFAC,PO | Performed by: OBSTETRICS & GYNECOLOGY

## 2019-04-26 PROCEDURE — 80307 DRUG TEST PRSMV CHEM ANLYZR: CPT

## 2019-04-26 NOTE — PROGRESS NOTES
"  Subjective:       Patient ID: Vani Hernandez is a 34 y.o. female.    Chief Complaint:  Amenorrhea      History of Present Illness  33yo  with amenorrhea, LMP around 3/15 or 3/25.  Denies vb/spotting.  Occ light cramping.  No n/v.  Not taking PNV.  5 previous full term vaginal deliveries (3 were put up for adoption) and 1 sab.  Denies any problems during those pregnancies.    GYN & OB History  Patient's last menstrual period was 03/15/2019 (approximate).   Date of Last Pap: No result found    OB History    Para Term  AB Living   6 5 5   1 5   SAB TAB Ectopic Multiple Live Births   1       5      # Outcome Date GA Lbr Abdoulaye/2nd Weight Sex Delivery Anes PTL Lv   6 SAB 2016     SAB      5 Term  40w0d  3.175 kg (7 lb) M    ARIANA   4 Term  40w0d  3.175 kg (7 lb) M Vag-Spont EPI N ARIANA   3 Term  40w0d   F Vag-Spont EPI  ARIANA   2 Term  40w0d    Vag-Spont EPI  ARIANA   1 Term  40w0d    Vag-Spont EPI  ARIANA       Review of Systems  Review of Systems: neg x 10, except as per HPI        Objective:    Physical Exam     /64   Ht 5' 4" (1.626 m)   Wt 61.1 kg (134 lb 13 oz)   LMP 03/15/2019 (Approximate)   BMI 23.14 kg/m²     UPT positive    Gen: NAD  Resp: Normal respiratory effort  Abd: soft, NT  Pelvic: deferred  Ext: normal ROM  Psych: appropriate affect  Neuro: grossly intact    Assessment:        1. Amenorrhea    2. Date of last menstrual period (LMP) unknown              Plan:      UPT positive today.  Will schedule for dating US next available.    eRx PNV.  Counseling done, precautions given, all questions answered.  RTC 2 wks for US and new OB visit.      "

## 2019-04-29 LAB
AMPHET+METHAMPHET UR QL: NEGATIVE
BARBITURATES UR QL SCN>200 NG/ML: NEGATIVE
BENZODIAZ UR QL SCN>200 NG/ML: NEGATIVE
BZE UR QL SCN: NEGATIVE
CANNABINOIDS UR QL SCN: NORMAL
CREAT UR-MCNC: 141 MG/DL (ref 15–325)
ETHANOL UR-MCNC: <10 MG/DL
METHADONE UR QL SCN>300 NG/ML: NEGATIVE
OPIATES UR QL SCN: NEGATIVE
PCP UR QL SCN>25 NG/ML: NEGATIVE
TOXICOLOGY INFORMATION: NORMAL

## 2019-04-30 LAB
C TRACH DNA SPEC QL NAA+PROBE: NOT DETECTED
N GONORRHOEA DNA SPEC QL NAA+PROBE: NOT DETECTED

## 2019-05-02 ENCOUNTER — TELEPHONE (OUTPATIENT)
Dept: OBSTETRICS AND GYNECOLOGY | Facility: CLINIC | Age: 34
End: 2019-05-02

## 2019-05-02 LAB — BACTERIA UR CULT: NORMAL

## 2019-05-02 RX ORDER — NITROFURANTOIN 25; 75 MG/1; MG/1
100 CAPSULE ORAL 2 TIMES DAILY
Qty: 14 CAPSULE | Refills: 0 | Status: SHIPPED | OUTPATIENT
Start: 2019-05-02 | End: 2019-05-09

## 2019-05-02 NOTE — TELEPHONE ENCOUNTER
Please let the patient know she has a urinary tract infection.  I have called in an antibiotic for her.  Please let me know if she has any questions.  Thank you.

## 2019-05-16 ENCOUNTER — LAB VISIT (OUTPATIENT)
Dept: LAB | Facility: HOSPITAL | Age: 34
End: 2019-05-16
Attending: OBSTETRICS & GYNECOLOGY
Payer: MEDICAID

## 2019-05-16 ENCOUNTER — TELEPHONE (OUTPATIENT)
Dept: OBSTETRICS AND GYNECOLOGY | Facility: CLINIC | Age: 34
End: 2019-05-16

## 2019-05-16 ENCOUNTER — PROCEDURE VISIT (OUTPATIENT)
Dept: OBSTETRICS AND GYNECOLOGY | Facility: CLINIC | Age: 34
End: 2019-05-16
Payer: MEDICAID

## 2019-05-16 ENCOUNTER — INITIAL PRENATAL (OUTPATIENT)
Dept: OBSTETRICS AND GYNECOLOGY | Facility: CLINIC | Age: 34
End: 2019-05-16
Payer: MEDICAID

## 2019-05-16 VITALS
SYSTOLIC BLOOD PRESSURE: 110 MMHG | DIASTOLIC BLOOD PRESSURE: 62 MMHG | WEIGHT: 134.69 LBS | BODY MASS INDEX: 23.12 KG/M2

## 2019-05-16 DIAGNOSIS — F41.9 ANXIETY DURING PREGNANCY IN FIRST TRIMESTER, ANTEPARTUM: ICD-10-CM

## 2019-05-16 DIAGNOSIS — O09.71 SUPERVISION OF HIGH RISK PREGNANCY DUE TO SOCIAL PROBLEMS IN FIRST TRIMESTER: ICD-10-CM

## 2019-05-16 DIAGNOSIS — O99.341 ANXIETY DURING PREGNANCY IN FIRST TRIMESTER, ANTEPARTUM: ICD-10-CM

## 2019-05-16 DIAGNOSIS — O09.71 SUPERVISION OF HIGH RISK PREGNANCY DUE TO SOCIAL PROBLEMS IN FIRST TRIMESTER: Primary | ICD-10-CM

## 2019-05-16 DIAGNOSIS — Z64.1 GRAND MULTIPARITY: ICD-10-CM

## 2019-05-16 DIAGNOSIS — Z78.9 DATE OF LAST MENSTRUAL PERIOD (LMP) UNKNOWN: ICD-10-CM

## 2019-05-16 DIAGNOSIS — N91.2 AMENORRHEA: ICD-10-CM

## 2019-05-16 LAB
ABO + RH BLD: NORMAL
ANION GAP SERPL CALC-SCNC: 9 MMOL/L (ref 8–16)
BASOPHILS # BLD AUTO: 0.03 K/UL (ref 0–0.2)
BASOPHILS NFR BLD: 0.3 % (ref 0–1.9)
BLD GP AB SCN CELLS X3 SERPL QL: NORMAL
BUN SERPL-MCNC: 15 MG/DL (ref 6–20)
CALCIUM SERPL-MCNC: 9.6 MG/DL (ref 8.7–10.5)
CHLORIDE SERPL-SCNC: 104 MMOL/L (ref 95–110)
CO2 SERPL-SCNC: 24 MMOL/L (ref 23–29)
CREAT SERPL-MCNC: 0.8 MG/DL (ref 0.5–1.4)
DIFFERENTIAL METHOD: NORMAL
EOSINOPHIL # BLD AUTO: 0.1 K/UL (ref 0–0.5)
EOSINOPHIL NFR BLD: 1.4 % (ref 0–8)
ERYTHROCYTE [DISTWIDTH] IN BLOOD BY AUTOMATED COUNT: 12.1 % (ref 11.5–14.5)
EST. GFR  (AFRICAN AMERICAN): >60 ML/MIN/1.73 M^2
EST. GFR  (NON AFRICAN AMERICAN): >60 ML/MIN/1.73 M^2
GLUCOSE SERPL-MCNC: 87 MG/DL (ref 70–110)
HCT VFR BLD AUTO: 37 % (ref 37–48.5)
HGB BLD-MCNC: 12.1 G/DL (ref 12–16)
LYMPHOCYTES # BLD AUTO: 2 K/UL (ref 1–4.8)
LYMPHOCYTES NFR BLD: 19.5 % (ref 18–48)
MCH RBC QN AUTO: 28.1 PG (ref 27–31)
MCHC RBC AUTO-ENTMCNC: 32.7 G/DL (ref 32–36)
MCV RBC AUTO: 86 FL (ref 82–98)
MONOCYTES # BLD AUTO: 0.6 K/UL (ref 0.3–1)
MONOCYTES NFR BLD: 6.3 % (ref 4–15)
NEUTROPHILS # BLD AUTO: 7.3 K/UL (ref 1.8–7.7)
NEUTROPHILS NFR BLD: 72.3 % (ref 38–73)
PLATELET # BLD AUTO: 271 K/UL (ref 150–350)
PMV BLD AUTO: 9.9 FL (ref 9.2–12.9)
POTASSIUM SERPL-SCNC: 3.7 MMOL/L (ref 3.5–5.1)
RBC # BLD AUTO: 4.3 M/UL (ref 4–5.4)
SODIUM SERPL-SCNC: 137 MMOL/L (ref 136–145)
WBC # BLD AUTO: 10.09 K/UL (ref 3.9–12.7)

## 2019-05-16 PROCEDURE — 86592 SYPHILIS TEST NON-TREP QUAL: CPT

## 2019-05-16 PROCEDURE — 99999 PR PBB SHADOW E&M-EST. PATIENT-LVL II: CPT | Mod: PBBFAC,,, | Performed by: OBSTETRICS & GYNECOLOGY

## 2019-05-16 PROCEDURE — 87624 HPV HI-RISK TYP POOLED RSLT: CPT

## 2019-05-16 PROCEDURE — 76801 OB US < 14 WKS SINGLE FETUS: CPT | Mod: PBBFAC,PO | Performed by: OBSTETRICS & GYNECOLOGY

## 2019-05-16 PROCEDURE — 99212 OFFICE O/P EST SF 10 MIN: CPT | Mod: PBBFAC,TH,PO | Performed by: OBSTETRICS & GYNECOLOGY

## 2019-05-16 PROCEDURE — 87510 GARDNER VAG DNA DIR PROBE: CPT

## 2019-05-16 PROCEDURE — 86762 RUBELLA ANTIBODY: CPT

## 2019-05-16 PROCEDURE — 99213 PR OFFICE/OUTPT VISIT, EST, LEVL III, 20-29 MIN: ICD-10-PCS | Mod: S$PBB,TH,, | Performed by: OBSTETRICS & GYNECOLOGY

## 2019-05-16 PROCEDURE — 87340 HEPATITIS B SURFACE AG IA: CPT

## 2019-05-16 PROCEDURE — 87480 CANDIDA DNA DIR PROBE: CPT

## 2019-05-16 PROCEDURE — 81220 CFTR GENE COM VARIANTS: CPT

## 2019-05-16 PROCEDURE — 83020 HEMOGLOBIN ELECTROPHORESIS: CPT

## 2019-05-16 PROCEDURE — 85025 COMPLETE CBC W/AUTO DIFF WBC: CPT

## 2019-05-16 PROCEDURE — 99213 OFFICE O/P EST LOW 20 MIN: CPT | Mod: S$PBB,TH,, | Performed by: OBSTETRICS & GYNECOLOGY

## 2019-05-16 PROCEDURE — 80048 BASIC METABOLIC PNL TOTAL CA: CPT

## 2019-05-16 PROCEDURE — 87491 CHLMYD TRACH DNA AMP PROBE: CPT

## 2019-05-16 PROCEDURE — 99999 PR PBB SHADOW E&M-EST. PATIENT-LVL II: ICD-10-PCS | Mod: PBBFAC,,, | Performed by: OBSTETRICS & GYNECOLOGY

## 2019-05-16 PROCEDURE — 86703 HIV-1/HIV-2 1 RESULT ANTBDY: CPT

## 2019-05-16 PROCEDURE — 86901 BLOOD TYPING SEROLOGIC RH(D): CPT

## 2019-05-16 PROCEDURE — 87086 URINE CULTURE/COLONY COUNT: CPT

## 2019-05-16 PROCEDURE — 76801 OB US < 14 WKS SINGLE FETUS: CPT | Mod: 26,S$PBB,, | Performed by: OBSTETRICS & GYNECOLOGY

## 2019-05-16 PROCEDURE — 88142 CYTOPATH C/V THIN LAYER: CPT

## 2019-05-16 PROCEDURE — 36415 COLL VENOUS BLD VENIPUNCTURE: CPT

## 2019-05-16 PROCEDURE — 76801 PR US, OB <14WKS, TRANSABD, SINGLE GESTATION: ICD-10-PCS | Mod: 26,S$PBB,, | Performed by: OBSTETRICS & GYNECOLOGY

## 2019-05-16 RX ORDER — NITROFURANTOIN 25; 75 MG/1; MG/1
CAPSULE ORAL
Refills: 0 | COMMUNITY
Start: 2019-05-14 | End: 2019-09-16

## 2019-05-16 RX ORDER — FOLIC ACID, .BETA.-CAROTENE, ASCORBIC ACID, CHOLECALCIFEROL, .ALPHA.-TOCOPHEROL ACETATE, DL-, THIAMINE MONONITRATE, RIBOFLAVIN, NIACINAMIDE, PYRIDOXINE HYDROCHLORIDE, CYANOCOBALAMIN, CALCIUM PANTOTHENATE, CALCIUM CARBONATE, FERROUS FUMARATE, AND ZINC OXIDE 1; 1000; 100; 400; 30; 3; 3; 15; 20; 12; 7; 200; 29; 20 MG/1; [IU]/1; MG/1; [IU]/1; [IU]/1; MG/1; MG/1; MG/1; MG/1; UG/1; MG/1; MG/1; MG/1; MG/1
TABLET, CHEWABLE ORAL
Refills: 11 | COMMUNITY
Start: 2019-05-14 | End: 2020-09-27 | Stop reason: CLARIF

## 2019-05-16 NOTE — TELEPHONE ENCOUNTER
----- Message from Liliana Contreras sent at 5/16/2019  2:11 PM CDT -----  Contact: [phone]/self  Patient calling to inform you that she is running late for her appointment. Please advise.

## 2019-05-16 NOTE — PROCEDURES
Procedures   Obstetrical ultrasound completed today.  See report in imaging section of [facility].

## 2019-05-16 NOTE — PROGRESS NOTES
35yo  at 8w6d presents for New OB visit.  Today c/o heartburn and anxiety.  States prior to pregnancy she was thinking about seeing a doctor for anxiety.  She often feel anxious but denies any SI/HI.  No other complaints today.  No cramping/vb.  Taking PNV.    Dating US today c/w LMP.    A/P:  35yo  at 8w6d for new OB visit  - High risk due to grandmultiparity and anxiety  - Discussed anxiety in pregnancy and importance of stable mental health for a healthy pregnancy.  Will monitor closely and start medication if necessary.  Currently denies any SI/HI, declines medical treatment at this time.  - Recommend tums, prilosec, zantac, etc for heartburn.  - New OB labs and cx today.  - Counseling done, precautions given, all questions answered.  RTC 4wks for OB visit.

## 2019-05-17 LAB
HBV SURFACE AG SERPL QL IA: NEGATIVE
HGB A2 MFR BLD HPLC: 2.6 % (ref 2.2–3.2)
HGB FRACT BLD ELPH-IMP: NORMAL
HGB FRACT BLD ELPH-IMP: NORMAL
HIV 1+2 AB+HIV1 P24 AG SERPL QL IA: NEGATIVE
RPR SER QL: NORMAL
RUBV IGG SER-ACNC: 20.6 IU/ML
RUBV IGG SER-IMP: REACTIVE

## 2019-05-18 LAB
BACTERIA UR CULT: NO GROWTH
BACTERIAL VAGINOSIS DNA: POSITIVE
CANDIDA GLABRATA DNA: NEGATIVE
CANDIDA KRUSEI DNA: NEGATIVE
CANDIDA RRNA VAG QL PROBE: POSITIVE
T VAGINALIS RRNA GENITAL QL PROBE: NEGATIVE

## 2019-05-19 LAB
C TRACH DNA SPEC QL NAA+PROBE: NOT DETECTED
N GONORRHOEA DNA SPEC QL NAA+PROBE: NOT DETECTED

## 2019-05-20 ENCOUNTER — TELEPHONE (OUTPATIENT)
Dept: OBSTETRICS AND GYNECOLOGY | Facility: CLINIC | Age: 34
End: 2019-05-20

## 2019-05-20 LAB — CFTR MUT ANL BLD/T: NORMAL

## 2019-05-20 RX ORDER — METRONIDAZOLE 500 MG/1
500 TABLET ORAL EVERY 12 HOURS
Qty: 14 TABLET | Refills: 0 | Status: SHIPPED | OUTPATIENT
Start: 2019-05-20 | End: 2019-05-27

## 2019-05-20 RX ORDER — FLUCONAZOLE 150 MG/1
TABLET ORAL
Qty: 2 TABLET | Refills: 0 | Status: SHIPPED | OUTPATIENT
Start: 2019-05-20 | End: 2019-09-16

## 2019-05-20 NOTE — TELEPHONE ENCOUNTER
Please let the patient know her cultures show she has a yeast infection and bacterial vaginosis.  These are not sexually transmitted diseases.  I have sent in diflucan and flagyl for her.  Let me know if she has any questions.  Thank you.

## 2019-05-22 LAB
HPV HR 12 DNA CVX QL NAA+PROBE: POSITIVE
HPV16 AG SPEC QL: NEGATIVE
HPV18 DNA SPEC QL NAA+PROBE: NEGATIVE

## 2019-06-20 ENCOUNTER — TELEPHONE (OUTPATIENT)
Dept: OBSTETRICS AND GYNECOLOGY | Facility: CLINIC | Age: 34
End: 2019-06-20

## 2019-06-20 NOTE — TELEPHONE ENCOUNTER
----- Message from Almaz Hernandez sent at 6/20/2019 12:22 PM CDT -----  Contact: self, [phone]  Patient has an appointment scheduled on 6/24 but requests to speak with you about getting severe acid reflux medication before then. Please advise.

## 2019-06-25 ENCOUNTER — ROUTINE PRENATAL (OUTPATIENT)
Dept: OBSTETRICS AND GYNECOLOGY | Facility: CLINIC | Age: 34
End: 2019-06-25
Payer: MEDICAID

## 2019-06-25 ENCOUNTER — TELEPHONE (OUTPATIENT)
Dept: OBSTETRICS AND GYNECOLOGY | Facility: CLINIC | Age: 34
End: 2019-06-25

## 2019-06-25 VITALS
DIASTOLIC BLOOD PRESSURE: 60 MMHG | BODY MASS INDEX: 23.46 KG/M2 | WEIGHT: 136.69 LBS | SYSTOLIC BLOOD PRESSURE: 100 MMHG

## 2019-06-25 DIAGNOSIS — Z64.1 GRAND MULTIPARITY: ICD-10-CM

## 2019-06-25 DIAGNOSIS — O26.892 HEARTBURN DURING PREGNANCY IN SECOND TRIMESTER: ICD-10-CM

## 2019-06-25 DIAGNOSIS — O99.341 ANXIETY DURING PREGNANCY IN FIRST TRIMESTER, ANTEPARTUM: ICD-10-CM

## 2019-06-25 DIAGNOSIS — O09.71 SUPERVISION OF HIGH RISK PREGNANCY DUE TO SOCIAL PROBLEMS IN FIRST TRIMESTER: Primary | ICD-10-CM

## 2019-06-25 DIAGNOSIS — F41.9 ANXIETY DURING PREGNANCY IN FIRST TRIMESTER, ANTEPARTUM: ICD-10-CM

## 2019-06-25 DIAGNOSIS — R12 HEARTBURN DURING PREGNANCY IN SECOND TRIMESTER: ICD-10-CM

## 2019-06-25 PROCEDURE — 99999 PR PBB SHADOW E&M-EST. PATIENT-LVL II: CPT | Mod: PBBFAC,,, | Performed by: OBSTETRICS & GYNECOLOGY

## 2019-06-25 PROCEDURE — 99212 OFFICE O/P EST SF 10 MIN: CPT | Mod: PBBFAC,TH,PO | Performed by: OBSTETRICS & GYNECOLOGY

## 2019-06-25 PROCEDURE — 99213 OFFICE O/P EST LOW 20 MIN: CPT | Mod: S$PBB,TH,, | Performed by: OBSTETRICS & GYNECOLOGY

## 2019-06-25 PROCEDURE — 99999 PR PBB SHADOW E&M-EST. PATIENT-LVL II: ICD-10-PCS | Mod: PBBFAC,,, | Performed by: OBSTETRICS & GYNECOLOGY

## 2019-06-25 PROCEDURE — 99213 PR OFFICE/OUTPT VISIT, EST, LEVL III, 20-29 MIN: ICD-10-PCS | Mod: S$PBB,TH,, | Performed by: OBSTETRICS & GYNECOLOGY

## 2019-06-25 RX ORDER — PANTOPRAZOLE SODIUM 40 MG/1
40 TABLET, DELAYED RELEASE ORAL DAILY
Qty: 30 TABLET | Refills: 1 | Status: SHIPPED | OUTPATIENT
Start: 2019-06-25 | End: 2021-09-01

## 2019-06-25 NOTE — PROGRESS NOTES
C/o continued heartburn despite zantac.  Often skips meals in order to avoid the pain/heartburn.  Still occ feels anxious but denies any SI/HI and declines medication.  No other complaints today.  No cramping/vb.  Not taking PNV due to heartburn.    A/P:  33yo  at 14w4d   - High risk due to grandmultiparity and anxiety  - Discussed anxiety in pregnancy and importance of stable mental health for a healthy pregnancy.  Will monitor closely and start medication if necessary.  Currently denies any SI/HI, declines medical treatment at this time.  - eRx Protonix for heartburn.  Recommend avoiding trigger foods, and no eating late at night.  - Encourage daily PNV - can drink with a glass of milk.  - Counseling done, precautions given, all questions answered.  RTC 3wks for OB visit and quad screen.

## 2019-07-17 ENCOUNTER — TELEPHONE (OUTPATIENT)
Dept: OBSTETRICS AND GYNECOLOGY | Facility: CLINIC | Age: 34
End: 2019-07-17

## 2019-07-17 NOTE — TELEPHONE ENCOUNTER
Yes, she should elevate her feet, and she can also wear compression stockings.    Also, I see she doesn't have a follow up appt scheduled with me - can you please schedule one within the next week or two?  Thank you!

## 2019-07-17 NOTE — TELEPHONE ENCOUNTER
Pt complaining retaining fluid in her feet and ankles. Pt states that she isnt really drinking any water. Advised pt that she needs to increase her fluid intake and try to elevate her feet as much as possible. Please give any further recommendations

## 2019-07-17 NOTE — TELEPHONE ENCOUNTER
appt schedule for 07/24/19.    Pt notified and verbalized about elevation and compression stockings

## 2019-07-17 NOTE — TELEPHONE ENCOUNTER
----- Message from Melanie Bernal sent at 7/17/2019  9:11 AM CDT -----  Contact: [phone]/self   Patient calling to speak with you about feet swelling.  Patient is pregnant  Please call back to assist at [phone]

## 2019-07-24 ENCOUNTER — TELEPHONE (OUTPATIENT)
Dept: OBSTETRICS AND GYNECOLOGY | Facility: CLINIC | Age: 34
End: 2019-07-24

## 2019-08-01 ENCOUNTER — TELEPHONE (OUTPATIENT)
Dept: OBSTETRICS AND GYNECOLOGY | Facility: CLINIC | Age: 34
End: 2019-08-01

## 2019-08-01 NOTE — TELEPHONE ENCOUNTER
I have not seen her since she was 14 weeks.  She needs to be seen in clinic.  I will discuss the varicose veins with her in person and schedule her anatomy US at that time.  Thank you.

## 2019-08-01 NOTE — TELEPHONE ENCOUNTER
Called pt back and pt states that she will only wear compression stocking only while wearing pants which is hardly ever. Would like to have referral to see a specailist. Pt is concerned if she suffers from something that may have resulted this 4 inch long vein on left leg. Pt doesn't drink water. Pt has stinging like pain that runs up the leg sometimes. Pt also inquired about an up coming u/s appointment she was told she should have soon. Please advise

## 2019-08-01 NOTE — TELEPHONE ENCOUNTER
Patient is have lots of pain from her varicose veins.  The medication she is taking is not working.  Please advise.

## 2019-08-01 NOTE — TELEPHONE ENCOUNTER
Patient is not taking any medication and she is not wearing the compression stocking because it look dumb in the summer.  The doctor advise she wear compression stockings.

## 2019-08-01 NOTE — TELEPHONE ENCOUNTER
----- Message from Mónica Darnell sent at 8/1/2019  8:25 AM CDT -----  Contact: [phone] /self  Type:  Same Day Appointment Request    Caller is requesting a same day appointment.  Caller declined first available appointment listed below.    Name of Caller: self  When is the first available appointment?   Symptoms: Behind on her OB visits and having problem with vein in her leg  Best Call Back Number: [phone]  Additional Information:  She was moving to a new place

## 2019-08-01 NOTE — TELEPHONE ENCOUNTER
Please let this patient know she has to use the compression stocking or she will still have the pain if not.

## 2019-08-06 ENCOUNTER — ROUTINE PRENATAL (OUTPATIENT)
Dept: OBSTETRICS AND GYNECOLOGY | Facility: CLINIC | Age: 34
End: 2019-08-06
Payer: MEDICAID

## 2019-08-06 VITALS — DIASTOLIC BLOOD PRESSURE: 70 MMHG | SYSTOLIC BLOOD PRESSURE: 100 MMHG | WEIGHT: 142 LBS | BODY MASS INDEX: 24.37 KG/M2

## 2019-08-06 DIAGNOSIS — F41.9 ANXIETY DURING PREGNANCY IN FIRST TRIMESTER, ANTEPARTUM: ICD-10-CM

## 2019-08-06 DIAGNOSIS — O99.341 ANXIETY DURING PREGNANCY IN FIRST TRIMESTER, ANTEPARTUM: ICD-10-CM

## 2019-08-06 DIAGNOSIS — O09.32 INSUFFICIENT PRENATAL CARE IN SECOND TRIMESTER: ICD-10-CM

## 2019-08-06 DIAGNOSIS — O09.71 SUPERVISION OF HIGH RISK PREGNANCY DUE TO SOCIAL PROBLEMS IN FIRST TRIMESTER: Primary | ICD-10-CM

## 2019-08-06 DIAGNOSIS — Z64.1 GRAND MULTIPARITY: ICD-10-CM

## 2019-08-06 DIAGNOSIS — O22.00 VARICOSE VEINS DURING PREGNANCY, ANTEPARTUM: ICD-10-CM

## 2019-08-06 PROCEDURE — 99212 OFFICE O/P EST SF 10 MIN: CPT | Mod: PBBFAC,TH,PO | Performed by: OBSTETRICS & GYNECOLOGY

## 2019-08-06 PROCEDURE — 99213 PR OFFICE/OUTPT VISIT, EST, LEVL III, 20-29 MIN: ICD-10-PCS | Mod: S$PBB,TH,, | Performed by: OBSTETRICS & GYNECOLOGY

## 2019-08-06 PROCEDURE — 99999 PR PBB SHADOW E&M-EST. PATIENT-LVL II: ICD-10-PCS | Mod: PBBFAC,,, | Performed by: OBSTETRICS & GYNECOLOGY

## 2019-08-06 PROCEDURE — 99213 OFFICE O/P EST LOW 20 MIN: CPT | Mod: S$PBB,TH,, | Performed by: OBSTETRICS & GYNECOLOGY

## 2019-08-06 PROCEDURE — 99999 PR PBB SHADOW E&M-EST. PATIENT-LVL II: CPT | Mod: PBBFAC,,, | Performed by: OBSTETRICS & GYNECOLOGY

## 2019-08-07 ENCOUNTER — TELEPHONE (OUTPATIENT)
Dept: OBSTETRICS AND GYNECOLOGY | Facility: CLINIC | Age: 34
End: 2019-08-07

## 2019-08-07 NOTE — TELEPHONE ENCOUNTER
----- Message from Roselyn Cortez sent at 8/7/2019  9:20 AM CDT -----  .Type:  Patient Returning Call    Who Called:self  Who Left Message for Patient:caitlin  Does the patient know what this is regarding?:call  Would the patient rather a call back or a response via [facility]? call  Best Call Back Number:.[phone] (home)   Additional Information:

## 2019-08-10 ENCOUNTER — LAB VISIT (OUTPATIENT)
Dept: LAB | Facility: HOSPITAL | Age: 34
End: 2019-08-10
Attending: OBSTETRICS & GYNECOLOGY
Payer: MEDICAID

## 2019-08-10 DIAGNOSIS — Z64.1 GRAND MULTIPARITY: ICD-10-CM

## 2019-08-10 DIAGNOSIS — F41.9 ANXIETY DURING PREGNANCY IN FIRST TRIMESTER, ANTEPARTUM: ICD-10-CM

## 2019-08-10 DIAGNOSIS — O09.71 SUPERVISION OF HIGH RISK PREGNANCY DUE TO SOCIAL PROBLEMS IN FIRST TRIMESTER: ICD-10-CM

## 2019-08-10 DIAGNOSIS — O99.341 ANXIETY DURING PREGNANCY IN FIRST TRIMESTER, ANTEPARTUM: ICD-10-CM

## 2019-08-10 DIAGNOSIS — O09.32 INSUFFICIENT PRENATAL CARE IN SECOND TRIMESTER: ICD-10-CM

## 2019-08-10 PROCEDURE — 81511 FTL CGEN ABNOR FOUR ANAL: CPT

## 2019-08-10 PROCEDURE — 36415 COLL VENOUS BLD VENIPUNCTURE: CPT

## 2019-08-12 NOTE — PROGRESS NOTES
Insufficient PNC - has not been seen since 14wga.  Today c/o varicose veins.  Does not want to wear compression stockings due to heat.  No other complaints today.  No ctx/vb/lof.  +FM.  Taking PNV.     PE: small varicose vein noted on LLE.  Nontender. No erythema.    A/P:  33yo  at 20w4d   - High risk due to grandmultiparity and anxiety, stable off meds  - Insufficient PNC.  Discussed importance of compliance with prenatal visits.  - Discussed varicose veins - encouraged compression stockings and rest/elevation.  - Quad screen today.  - Counseling done, precautions given, all questions answered.  RTC 2 wks for OB visit and anatomy US.

## 2019-08-14 LAB
# FETUSES US: ABNORMAL
2ND TRIMESTER 4 SCREEN PNL SERPL: POSITIVE
2ND TRIMESTER 4 SCREEN SERPL-IMP: ABNORMAL
AFP MOM SERPL: 0.62
AFP SERPL-MCNC: 45.7 NG/ML
AGE AT DELIVERY: 34
B-HCG MOM SERPL: 1.3
B-HCG SERPL-ACNC: 25 IU/ML
FET TS 21 RISK FROM MAT AGE: ABNORMAL
GA (DAYS): 1 D
GA (WEEKS): 21 WK
GA METHOD: ABNORMAL
IDDM PATIENT QL: ABNORMAL
INHIBIN A MOM SERPL: 1.69
INHIBIN A SERPL-MCNC: 348.1 PG/ML
SMOKING STATUS FTND: NO
TS 18 RISK FETUS: ABNORMAL
TS 21 RISK FETUS: ABNORMAL
U ESTRIOL MOM SERPL: 1.44
U ESTRIOL SERPL-MCNC: 3.6 NG/ML

## 2019-08-15 ENCOUNTER — TELEPHONE (OUTPATIENT)
Dept: OBSTETRICS AND GYNECOLOGY | Facility: CLINIC | Age: 34
End: 2019-08-15

## 2019-08-15 NOTE — TELEPHONE ENCOUNTER
Spoke with patient - will have [id number] drawn today.  All questions answered.      Please still schedule with MFM next available.  Thanks.

## 2019-08-15 NOTE — TELEPHONE ENCOUNTER
----- Message from Balaji Brock sent at 8/15/2019 10:34 AM CDT -----  Contact: self  Pt is awaiting at [facility] to have requested test by  but the agency is requesting a copy of Demographics with insurance information  Please fax over requested info to [phone].... Patient is waiting

## 2019-08-15 NOTE — TELEPHONE ENCOUNTER
I have been unable to reach this patient to discuss her abnormal quad screen.  She will need to have the MaterniT 21 drawn, and I'd like her to have her anatomy with [facility] instead - can someone please schedule that?  Please let me know if you are able to get in touch with the patient and if she has any questions.  Thanks.

## 2019-08-16 NOTE — TELEPHONE ENCOUNTER
I still cannot reach the patient, and it does not let me leave a voicemail.  If someone is able to reach her, please let me know so I can talk to her.  Thanks.

## 2019-08-21 ENCOUNTER — PROCEDURE VISIT (OUTPATIENT)
Dept: OBSTETRICS AND GYNECOLOGY | Facility: CLINIC | Age: 34
End: 2019-08-21
Payer: MEDICAID

## 2019-08-21 DIAGNOSIS — F41.9 ANXIETY DURING PREGNANCY IN FIRST TRIMESTER, ANTEPARTUM: ICD-10-CM

## 2019-08-21 DIAGNOSIS — O09.32 INSUFFICIENT PRENATAL CARE IN SECOND TRIMESTER: ICD-10-CM

## 2019-08-21 DIAGNOSIS — O09.71 SUPERVISION OF HIGH RISK PREGNANCY DUE TO SOCIAL PROBLEMS IN FIRST TRIMESTER: ICD-10-CM

## 2019-08-21 DIAGNOSIS — O99.341 ANXIETY DURING PREGNANCY IN FIRST TRIMESTER, ANTEPARTUM: ICD-10-CM

## 2019-08-21 DIAGNOSIS — Z64.1 GRAND MULTIPARITY: ICD-10-CM

## 2019-08-21 DIAGNOSIS — Z36.89 ENCOUNTER FOR FETAL ANATOMIC SURVEY: ICD-10-CM

## 2019-08-21 PROCEDURE — 76805 OB US >/= 14 WKS SNGL FETUS: CPT | Mod: PBBFAC,PO | Performed by: OBSTETRICS & GYNECOLOGY

## 2019-08-21 PROCEDURE — 76805 OB US >/= 14 WKS SNGL FETUS: CPT | Mod: 26,S$PBB,, | Performed by: OBSTETRICS & GYNECOLOGY

## 2019-08-21 PROCEDURE — 76805 PR US, OB 14+WKS, TRANSABD, SINGLE GESTATION: ICD-10-PCS | Mod: 26,S$PBB,, | Performed by: OBSTETRICS & GYNECOLOGY

## 2019-08-22 ENCOUNTER — TELEPHONE (OUTPATIENT)
Dept: OBSTETRICS AND GYNECOLOGY | Facility: CLINIC | Age: 34
End: 2019-08-22

## 2019-08-22 NOTE — TELEPHONE ENCOUNTER
She can use Monistat over the counter.  If there is no improvement in her symptoms, we can test vaginal cultures at her visit on Tuesday. Thanks.

## 2019-08-22 NOTE — TELEPHONE ENCOUNTER
----- Message from Paulette Mabry sent at 8/22/2019 11:11 AM CDT -----  Contact: Self [phone]  Patient is requesting to talk to nurse in regards to she is having severe irritation on her private area and since she is pregnant she is concern.

## 2019-08-22 NOTE — TELEPHONE ENCOUNTER
----- Message from Paulette Mabry sent at 8/22/2019 11:11 AM CDT -----  Contact: Self [phone]  Patient is requesting to talk to nurse in regards to she is having severe irritation on her private area and since she is pregnant she is concern.

## 2019-09-16 ENCOUNTER — TELEPHONE (OUTPATIENT)
Dept: OBSTETRICS AND GYNECOLOGY | Facility: CLINIC | Age: 34
End: 2019-09-16

## 2019-09-16 ENCOUNTER — ROUTINE PRENATAL (OUTPATIENT)
Dept: OBSTETRICS AND GYNECOLOGY | Facility: CLINIC | Age: 34
End: 2019-09-16
Payer: MEDICAID

## 2019-09-16 VITALS
DIASTOLIC BLOOD PRESSURE: 62 MMHG | WEIGHT: 144.63 LBS | SYSTOLIC BLOOD PRESSURE: 100 MMHG | BODY MASS INDEX: 24.82 KG/M2

## 2019-09-16 DIAGNOSIS — O22.02 OBSTETRIC VARICOSE VEINS IN SECOND TRIMESTER: ICD-10-CM

## 2019-09-16 DIAGNOSIS — O09.32 INSUFFICIENT PRENATAL CARE IN SECOND TRIMESTER: Primary | ICD-10-CM

## 2019-09-16 PROCEDURE — 99213 PR OFFICE/OUTPT VISIT, EST, LEVL III, 20-29 MIN: ICD-10-PCS | Mod: S$PBB,TH,, | Performed by: OBSTETRICS & GYNECOLOGY

## 2019-09-16 PROCEDURE — 99213 OFFICE O/P EST LOW 20 MIN: CPT | Mod: S$PBB,TH,, | Performed by: OBSTETRICS & GYNECOLOGY

## 2019-09-16 PROCEDURE — 99213 OFFICE O/P EST LOW 20 MIN: CPT | Mod: PBBFAC,TH,PO | Performed by: OBSTETRICS & GYNECOLOGY

## 2019-09-16 PROCEDURE — 99999 PR PBB SHADOW E&M-EST. PATIENT-LVL III: ICD-10-PCS | Mod: PBBFAC,,, | Performed by: OBSTETRICS & GYNECOLOGY

## 2019-09-16 PROCEDURE — 99999 PR PBB SHADOW E&M-EST. PATIENT-LVL III: CPT | Mod: PBBFAC,,, | Performed by: OBSTETRICS & GYNECOLOGY

## 2019-09-16 NOTE — PROGRESS NOTES
Insufficient PNC - has not been seen since 20wga.  Patient states that she has been moving around a lot.  Continues with c/o varicose veins.  No relief with rest and compression stockings.  No other complaints today.  No ctx/vb/lof.  +FM.  Not taking PNV.     PE: small varicose vein noted in BLE.  Nontender. No erythema.  1+ edema bilaterally.     A/P:  35yo  at 26w3d   - High risk due to grandmultiparity and anxiety, stable off meds  - Insufficient PNC.  Discussed importance of compliance with prenatal visits.  - Discussed varicose veins - encouraged compression stockings and rest/elevation.  Pt requests referral to vascular surgery.  Will place referral, but discussed will likely not treat till after pregnancy, as it may resolve after delivery.  - Glucola pending - pt states she will come tomorrow.  - Counseling done, precautions given, all questions answered.  RTC 3 wks for OB visit.

## 2019-09-24 ENCOUNTER — LAB VISIT (OUTPATIENT)
Dept: LAB | Facility: HOSPITAL | Age: 34
End: 2019-09-24
Attending: OBSTETRICS & GYNECOLOGY
Payer: MEDICAID

## 2019-09-24 DIAGNOSIS — O09.32 INSUFFICIENT PRENATAL CARE IN SECOND TRIMESTER: ICD-10-CM

## 2019-09-24 LAB — GLUCOSE SERPL-MCNC: 115 MG/DL (ref 70–140)

## 2019-09-24 PROCEDURE — 36415 COLL VENOUS BLD VENIPUNCTURE: CPT

## 2019-09-24 PROCEDURE — 82950 GLUCOSE TEST: CPT

## 2019-11-12 ENCOUNTER — TELEPHONE (OUTPATIENT)
Dept: VASCULAR SURGERY | Facility: CLINIC | Age: 34
End: 2019-11-12

## 2019-11-20 ENCOUNTER — HOSPITAL ENCOUNTER (OUTPATIENT)
Facility: HOSPITAL | Age: 34
Discharge: HOME | End: 2019-11-21
Attending: OBSTETRICS & GYNECOLOGY | Admitting: OBSTETRICS & GYNECOLOGY
Payer: MEDICAID

## 2019-11-20 ENCOUNTER — HOSPITAL ENCOUNTER (EMERGENCY)
Facility: HOSPITAL | Age: 34
Discharge: HOME | End: 2019-11-20
Attending: EMERGENCY MEDICINE
Payer: MEDICAID

## 2019-11-20 VITALS
SYSTOLIC BLOOD PRESSURE: 112 MMHG | TEMPERATURE: 99 F | WEIGHT: 146 LBS | HEART RATE: 98 BPM | HEIGHT: 64 IN | DIASTOLIC BLOOD PRESSURE: 70 MMHG | OXYGEN SATURATION: 99 % | BODY MASS INDEX: 24.92 KG/M2 | RESPIRATION RATE: 18 BRPM

## 2019-11-20 DIAGNOSIS — R11.2 NON-INTRACTABLE VOMITING WITH NAUSEA: ICD-10-CM

## 2019-11-20 DIAGNOSIS — R10.84 GENERALIZED ABDOMINAL PAIN: ICD-10-CM

## 2019-11-20 DIAGNOSIS — R11.2 NON-INTRACTABLE VOMITING WITH NAUSEA, UNSPECIFIED VOMITING TYPE: Primary | ICD-10-CM

## 2019-11-20 LAB
ALBUMIN SERPL BCP-MCNC: 3 G/DL (ref 3.5–5.2)
ALP SERPL-CCNC: 248 U/L (ref 55–135)
ALT SERPL W/O P-5'-P-CCNC: 8 U/L (ref 10–44)
ANION GAP SERPL CALC-SCNC: 11 MMOL/L (ref 8–16)
AST SERPL-CCNC: 14 U/L (ref 10–40)
BASOPHILS # BLD AUTO: 0.02 K/UL (ref 0–0.2)
BASOPHILS NFR BLD: 0.2 % (ref 0–1.9)
BILIRUB SERPL-MCNC: 1.4 MG/DL (ref 0.1–1)
BILIRUB UR QL STRIP: NEGATIVE
BUN SERPL-MCNC: 13 MG/DL (ref 6–20)
CALCIUM SERPL-MCNC: 8.6 MG/DL (ref 8.7–10.5)
CHLORIDE SERPL-SCNC: 107 MMOL/L (ref 95–110)
CLARITY UR: CLEAR
CO2 SERPL-SCNC: 22 MMOL/L (ref 23–29)
COLOR UR: YELLOW
CREAT SERPL-MCNC: 0.7 MG/DL (ref 0.5–1.4)
DIFFERENTIAL METHOD: ABNORMAL
EOSINOPHIL # BLD AUTO: 0 K/UL (ref 0–0.5)
EOSINOPHIL NFR BLD: 0.3 % (ref 0–8)
ERYTHROCYTE [DISTWIDTH] IN BLOOD BY AUTOMATED COUNT: 12.7 % (ref 11.5–14.5)
EST. GFR  (AFRICAN AMERICAN): >60 ML/MIN/1.73 M^2
EST. GFR  (NON AFRICAN AMERICAN): >60 ML/MIN/1.73 M^2
GLUCOSE SERPL-MCNC: 77 MG/DL (ref 70–110)
GLUCOSE UR QL STRIP: NEGATIVE
HCT VFR BLD AUTO: 36.6 % (ref 37–48.5)
HGB BLD-MCNC: 11.8 G/DL (ref 12–16)
HGB UR QL STRIP: NEGATIVE
INFLUENZA A, MOLECULAR: NEGATIVE
INFLUENZA B, MOLECULAR: NEGATIVE
KETONES UR QL STRIP: ABNORMAL
LEUKOCYTE ESTERASE UR QL STRIP: NEGATIVE
LIPASE SERPL-CCNC: 43 U/L (ref 4–60)
LYMPHOCYTES # BLD AUTO: 1.7 K/UL (ref 1–4.8)
LYMPHOCYTES NFR BLD: 15.2 % (ref 18–48)
MCH RBC QN AUTO: 28.5 PG (ref 27–31)
MCHC RBC AUTO-ENTMCNC: 32.2 G/DL (ref 32–36)
MCV RBC AUTO: 88 FL (ref 82–98)
MONOCYTES # BLD AUTO: 0.6 K/UL (ref 0.3–1)
MONOCYTES NFR BLD: 5.9 % (ref 4–15)
NEUTROPHILS # BLD AUTO: 8.5 K/UL (ref 1.8–7.7)
NEUTROPHILS NFR BLD: 78.4 % (ref 38–73)
NITRITE UR QL STRIP: NEGATIVE
PH UR STRIP: 7 [PH] (ref 5–8)
PLATELET # BLD AUTO: 217 K/UL (ref 150–350)
PMV BLD AUTO: 10.2 FL (ref 9.2–12.9)
POTASSIUM SERPL-SCNC: 3.7 MMOL/L (ref 3.5–5.1)
PROT SERPL-MCNC: 6.6 G/DL (ref 6–8.4)
PROT UR QL STRIP: NEGATIVE
RBC # BLD AUTO: 4.14 M/UL (ref 4–5.4)
SODIUM SERPL-SCNC: 140 MMOL/L (ref 136–145)
SP GR UR STRIP: 1.02 (ref 1–1.03)
SPECIMEN SOURCE: NORMAL
URN SPEC COLLECT METH UR: ABNORMAL
UROBILINOGEN UR STRIP-ACNC: 1 EU/DL
WBC # BLD AUTO: 10.91 K/UL (ref 3.9–12.7)

## 2019-11-20 PROCEDURE — 99282 EMERGENCY DEPT VISIT SF MDM: CPT

## 2019-11-20 PROCEDURE — 87502 INFLUENZA DNA AMP PROBE: CPT

## 2019-11-20 PROCEDURE — 80053 COMPREHEN METABOLIC PANEL: CPT

## 2019-11-20 PROCEDURE — 63600175 PHARM REV CODE 636 W HCPCS: Performed by: EMERGENCY MEDICINE

## 2019-11-20 PROCEDURE — 83690 ASSAY OF LIPASE: CPT

## 2019-11-20 PROCEDURE — 85025 COMPLETE CBC W/AUTO DIFF WBC: CPT

## 2019-11-20 PROCEDURE — 25000003 PHARM REV CODE 250: Performed by: OBSTETRICS & GYNECOLOGY

## 2019-11-20 PROCEDURE — 81003 URINALYSIS AUTO W/O SCOPE: CPT

## 2019-11-20 RX ORDER — OSELTAMIVIR PHOSPHATE 75 MG/1
75 CAPSULE ORAL 2 TIMES DAILY
Qty: 10 CAPSULE | Refills: 0 | Status: SHIPPED | OUTPATIENT
Start: 2019-11-20 | End: 2019-11-25

## 2019-11-20 RX ORDER — SODIUM CHLORIDE, SODIUM LACTATE, POTASSIUM CHLORIDE, CALCIUM CHLORIDE 600; 310; 30; 20 MG/100ML; MG/100ML; MG/100ML; MG/100ML
1000 INJECTION, SOLUTION INTRAVENOUS
Status: COMPLETED | OUTPATIENT
Start: 2019-11-20 | End: 2019-11-20

## 2019-11-20 RX ORDER — FAMOTIDINE 20 MG/1
40 TABLET, FILM COATED ORAL DAILY
Status: DISCONTINUED | OUTPATIENT
Start: 2019-11-20 | End: 2019-11-21 | Stop reason: HOSPADM

## 2019-11-20 RX ADMIN — SODIUM CHLORIDE, SODIUM LACTATE, POTASSIUM CHLORIDE, AND CALCIUM CHLORIDE 1000 ML: .6; .31; .03; .02 INJECTION, SOLUTION INTRAVENOUS at 09:11

## 2019-11-20 RX ADMIN — FAMOTIDINE 40 MG: 20 TABLET, FILM COATED ORAL at 11:11

## 2019-11-21 VITALS — TEMPERATURE: 98 F | OXYGEN SATURATION: 98 % | HEART RATE: 60 BPM

## 2019-11-21 PROCEDURE — 63600175 PHARM REV CODE 636 W HCPCS: Performed by: OBSTETRICS & GYNECOLOGY

## 2019-11-21 PROCEDURE — 99211 OFF/OP EST MAY X REQ PHY/QHP: CPT | Mod: 25

## 2019-11-21 PROCEDURE — 96375 TX/PRO/DX INJ NEW DRUG ADDON: CPT

## 2019-11-21 PROCEDURE — 96361 HYDRATE IV INFUSION ADD-ON: CPT

## 2019-11-21 PROCEDURE — 96365 THER/PROPH/DIAG IV INF INIT: CPT

## 2019-11-21 PROCEDURE — 96366 THER/PROPH/DIAG IV INF ADDON: CPT

## 2019-11-21 PROCEDURE — 96374 THER/PROPH/DIAG INJ IV PUSH: CPT

## 2019-11-21 RX ORDER — BUTORPHANOL TARTRATE 2 MG/ML
1 INJECTION INTRAMUSCULAR; INTRAVENOUS ONCE
Status: COMPLETED | OUTPATIENT
Start: 2019-11-21 | End: 2019-11-21

## 2019-11-21 RX ADMIN — BUTORPHANOL TARTRATE 1 MG: 2 INJECTION, SOLUTION INTRAMUSCULAR; INTRAVENOUS at 01:11

## 2019-11-21 RX ADMIN — PROMETHAZINE HYDROCHLORIDE 12.5 MG: 25 INJECTION INTRAMUSCULAR; INTRAVENOUS at 01:11

## 2019-11-21 NOTE — PLAN OF CARE
220 pt arrived to unit after evaluation in ED. Pt is a 35.4  with nausea and vomiting X1 day. Pt stated that most of her children have been diagnosed with the flu. Pt placed on monitor. Palpable Contraction noted and +FM noted as well. SVE done the patient is 1.5 cm. With some bloody show to exam glove. Last vomitng episode was in the ED. Will call MD on call.

## 2019-11-21 NOTE — DISCHARGE INSTRUCTIONS
LABOR     What is  labor?   labor is labor that occurs before 37 completed weeks of pregnancy.  Your baby could be born to early and have serious health problems.    Will you have  labor?   labor can happen to any woman, but some women are at greater risk than others.  A woman is more likely to have  labor if she:  · Is pregnant with twins or more  · Had a premature birth (before 37 completed weeks of pregnancy), in another pregnancy  · Has certain problems of the uterus or cervix    Call your health care provider or go to the hospital right away if you have any of these warning signs:  · Contractions that make your belly tighten up like a fist every 10 minutes or more often  · Change in the color of your vaginal discharge, or bleeding from your vagina  · The feeling that your baby is pushing down.  This is called pelvic pressure  · Low, dull backache  · Cramps that feel like your period  · Belly cramps with or without diarrhea      Home Care:   It can help to drink plenty of water and take warm baths. Do what you can ahead of time to prepare for giving birth so youll have less to worry about later.   Keep a record of your contractions. Write down what time each one starts and how long it lasts. A stopwatch is helpful. Look for the pattern of regularly spaced out contractions with a gradual increase in the time each one lasts.   Dont be embarrassed about going to the hospital with a false alarm.     More about contractions    · When any muscle in your body contracts, it becomes tight or hard to the touch.  Your uterus is a muscle.  When it contracts, you will feel it tighten like a fist.  A contraction doesn't have to be painful.  It may feel like cramping or lower back pain.  When the contraction stops, your uterus becomes soft again.  · It's normal for your uterus to contract at times during pregnancy.  This may happen when you first lie down, after sex, or after  you walk up and down stairs.  · It is not normal to have regular, frequent contractions before your baby is due.  If you feel a contraction every 10 minutes or more often during 1 hour (more than five contractions in an hour), then your uterus is rafi too much.  Call your health care provider if this happens.                                GOING THE FULL 40 WEEKS      More and more births are being scheduled a little early for non-medical reasons.  Experts are learning that this can cause problems for both mom and baby.  If your pregnancy is healthy, wait for labor to begin on its own.    If your pregnancy is healthy and you are planning to schedule your baby's birth, its best to stay pregnant for at least 39 weeks.  Babies born too early may have more health problems at birth and later in life than babies born later.  Being pregnant 39 weeks gives your baby's body all the time it needs to grow.    Here's why your baby needs 39 weeks:    · Important organs, like his brain, lungs and liver, get all the time they need to develop  · He/she is less likely to have vision and hearing problems after birth  · He/she has time to gain more weight in the womb.  Babies born a healthy weight have an easier time staying warm than babies born too small  · He/she can suck and swallow and stay awake long to eat after he/she's born.  Babies born early sometimes can't do these things.                                                                 KICK COUNTS    Its normal to worry about your babys health. One way you can know your babys doing well is to record the babys movements once a day. This is called a kick count. You will usually feel your baby move by the 20th week of pregnancy. Remember to take your kick count records to all your appointments with your healthcare provider.     How to Count Kicks   Choose a time when the baby is active, such as after a meal.   Sit comfortably or lie on your side.   The first time  the baby moves, write down the time.   Count each movement until the baby has moved 10 times. This can take from 20 minutes to 2 hours.   If the baby hasnt moved 4 times in 1 hour, pat your stomach to wake the baby up.   Write down the time you feel the babys 10th movement.   Try to do it at the same time each day.    When to Call Your Healthcare Provider     Call your healthcare provider right away if you notice any of the following:   Your baby moves fewer than 10 times in 4 hours while youre doing kick counts.   Your baby moves much less often than on the days before.   You have not felt your baby move all day.        TIME NUMBER OF KICKS                                               Follow Labor Precautions:  Call MD or return to Labor and Delivery if...    Labor (after 36 weeks)  - Painful contractions every 5 minutes for 2 hours that do not go away with 2 bottles of water, 2 tylenol, and rest.      Labor (before 36 weeks)  - More than 4 contractions in 1 hour   -First try 2 bottles of water, rest, and 2 tylenol.... If the contractions do not go away call doctors office or after hours clinic first and/or come to hospital for evaluation.      Water Breaks  - Gush or leaking of fluid from vagina, or if unsure.     Vaginal bleeding  - Bright red bleeding like a period, soaking a pad in 1 hour.    Decreased or No fetal movement  - You should feel 10 movements within two hours.  -If you are not feeling the baby move.... Drink a glass of orange juice or apple juice  - If you DO NOT feel 10 movements within two hours, please  call the MD or come to the hospital.    Unable to keep fluids or food down for more than 24 hours    Blurred vision, spots before your eyes, dizziness, headache that does not get better with Tylenol (Acetaminophen), bad swelling, chest pain, or trouble breathing.    Drink 10-12 glasses of water daily  Take medications as prescribed  Keep all follow-up appointments

## 2019-11-21 NOTE — ED TRIAGE NOTES
Pt presents to ED via ambulance with L AC  IV apon arrival to Rm 21 and IV fluids complete NS. Pt stats she is 37 weeks gestation and pt of Dr Parekh. Pt C/O N/V since 1100 this am. Pt C/O stomach tighting and states she has not been checked for dilation and states she is suppose to be being checked every week and she has not been check in 3 weeks. Pt states she want to be checked for dilation.

## 2019-11-21 NOTE — ED PROVIDER NOTES
Encounter Date: 2019    SCRIBE #1 NOTE: I, Lexie Lechuga, am scribing for, and in the presence of, Dr. Denis.       History     Chief Complaint   Patient presents with    Emesis     36 3/7 week gestation OB pt of Dr. Parekh c/o vomiting onset today      Vani Hernandez is a 35 yo F who  has a past medical history of Asthma and GERD (gastroesophageal reflux disease).    Multigravida female, history  and currently 37w 3d pregnant by dates, presents to the ED complaining of nausea and vomiting that commenced today. She describes emesis as non-bloody and non-bilious; total of 10 episodes today. Associated symptoms include abdominal pain, which she describes as tightness. Patient denies any additional complaints. No fever, chills, diarrhea, dysuria, vaginal bleeding, vaginal discharge, pelvic pain, headache, or any other complaints. Patient has established PNC with Dr. Parekh, however follow-up is poor as per medical records.     The history is provided by the patient and medical records.     Review of patient's allergies indicates:  No Known Allergies  Past Medical History:   Diagnosis Date    Asthma     GERD (gastroesophageal reflux disease)      Past Surgical History:   Procedure Laterality Date    HERNIA REPAIR       Family History   Problem Relation Age of Onset    Diabetes Mother     No Known Problems Father     Breast cancer Neg Hx     Colon cancer Neg Hx     Ovarian cancer Neg Hx     Birth defects Neg Hx      Social History     Tobacco Use    Smoking status: Former Smoker    Smokeless tobacco: Never Used   Substance Use Topics    Alcohol use: Not Currently    Drug use: No     Review of Systems   Constitutional: Negative for chills and fever.   HENT: Negative for congestion, rhinorrhea and sore throat.    Eyes: Negative for redness and visual disturbance.   Respiratory: Negative for cough, shortness of breath and wheezing.    Cardiovascular: Negative for chest pain and palpitations.    Gastrointestinal: Positive for abdominal pain, nausea and vomiting. Negative for diarrhea.   Genitourinary: Negative for dysuria and hematuria.   Musculoskeletal: Negative for back pain, myalgias and neck pain.   Skin: Negative for rash.   Neurological: Negative for dizziness, weakness and light-headedness.   Psychiatric/Behavioral: Negative for confusion.       Physical Exam     Initial Vitals [11/20/19 1826]   BP Pulse Resp Temp SpO2   110/68 65 18 98.7 °F (37.1 °C) 100 %      MAP       --         Physical Exam    Nursing note and vitals reviewed.  Constitutional: She appears well-developed and well-nourished. No distress.   HENT:   Head: Normocephalic and atraumatic.   Mouth/Throat: Oropharynx is clear and moist.   Eyes: Conjunctivae and EOM are normal. Pupils are equal, round, and reactive to light.   Cardiovascular: Normal rate, regular rhythm and normal heart sounds.   Pulmonary/Chest: Breath sounds normal. No respiratory distress.   Abdominal: Soft. Bowel sounds are normal. There is no tenderness. There is no rebound and no guarding.   Gravid abdomen  Generalized tenderness   Musculoskeletal: Normal range of motion. She exhibits no edema or tenderness.   Neurological: She is alert and oriented to person, place, and time. She has normal strength.   Skin: Skin is warm and dry. Capillary refill takes less than 2 seconds. No rash noted.         ED Course   Procedures  Labs Reviewed   INFLUENZA A & B BY MOLECULAR   CBC W/ AUTO DIFFERENTIAL   COMPREHENSIVE METABOLIC PANEL   LIPASE   URINALYSIS, REFLEX TO URINE CULTURE          Imaging Results    None          Medical Decision Making:   Differential Diagnosis:   Differential Diagnosis includes, but is not limited to:  Pregnancy complication (threatened AB, inevitable AB, incomplete Ab, missed AB, ectopic pregnancy, placenta previa) AAA, aortic dissection, mesenteric ischemia, perforated viscous, MI/ACS, SBO/volvulus, incarcerated/strangulated hernia,  intussusception, ileus, appendicitis, cholecystitis, cholangitis, diverticulitis, esophagitis, hepatitis, nephrolithiasis, pancreatitis, gastroenteritis, colitis, IBD/IBS, biliary colic, GERD, PUD, constipation, UTI/pyelonephritis,  disorder.      ED Management:  Patient sent to L&D after discussion with ob gyn.                    ED Course as of Nov 20 2052 Wed Nov 20, 2019 2052 Discussed with . Recommends transfer to L&D. Labs pending at time of transfer.     [MM]      ED Course User Index  [MM] Lexie Lechuga                Clinical Impression:       ICD-10-CM ICD-9-CM   1. Non-intractable vomiting with nausea, unspecified vomiting type R11.2 787.01   2. Generalized abdominal pain R10.84 789.07       Disposition:   Disposition: Other  Condition: Stable      Scribe Attestation I, Jeferson Denis,  personally performed the services described in this documentation. All medical record entries made by the scribe were at my direction and in my presence.  I have reviewed the chart and agree that the record reflects my personal performance and is accurate and complete. Jeferson Denis M.D. 3:23 PM11/21/2019       Portions of this note were dictated using voice recognition software and may contain dictation related errors in spelling/grammar/syntax not found on text review               Jeferson Denis Jr., MD  11/21/19 1524

## 2019-12-04 ENCOUNTER — ROUTINE PRENATAL (OUTPATIENT)
Dept: OBSTETRICS AND GYNECOLOGY | Facility: CLINIC | Age: 34
End: 2019-12-04
Payer: MEDICAID

## 2019-12-04 ENCOUNTER — LAB VISIT (OUTPATIENT)
Dept: LAB | Facility: HOSPITAL | Age: 34
End: 2019-12-04
Attending: OBSTETRICS & GYNECOLOGY
Payer: MEDICAID

## 2019-12-04 VITALS
DIASTOLIC BLOOD PRESSURE: 80 MMHG | WEIGHT: 154.19 LBS | SYSTOLIC BLOOD PRESSURE: 118 MMHG | BODY MASS INDEX: 26.47 KG/M2

## 2019-12-04 DIAGNOSIS — O09.32 INSUFFICIENT PRENATAL CARE IN SECOND TRIMESTER: Primary | ICD-10-CM

## 2019-12-04 DIAGNOSIS — O09.32 INSUFFICIENT PRENATAL CARE IN SECOND TRIMESTER: ICD-10-CM

## 2019-12-04 LAB
BASOPHILS # BLD AUTO: 0.02 K/UL (ref 0–0.2)
BASOPHILS NFR BLD: 0.2 % (ref 0–1.9)
DIFFERENTIAL METHOD: ABNORMAL
EOSINOPHIL # BLD AUTO: 0.1 K/UL (ref 0–0.5)
EOSINOPHIL NFR BLD: 1.1 % (ref 0–8)
ERYTHROCYTE [DISTWIDTH] IN BLOOD BY AUTOMATED COUNT: 13 % (ref 11.5–14.5)
HCT VFR BLD AUTO: 36.7 % (ref 37–48.5)
HGB BLD-MCNC: 11.7 G/DL (ref 12–16)
LYMPHOCYTES # BLD AUTO: 2.5 K/UL (ref 1–4.8)
LYMPHOCYTES NFR BLD: 24.1 % (ref 18–48)
MCH RBC QN AUTO: 28.4 PG (ref 27–31)
MCHC RBC AUTO-ENTMCNC: 31.9 G/DL (ref 32–36)
MCV RBC AUTO: 89 FL (ref 82–98)
MONOCYTES # BLD AUTO: 0.9 K/UL (ref 0.3–1)
MONOCYTES NFR BLD: 8.3 % (ref 4–15)
NEUTROPHILS # BLD AUTO: 6.8 K/UL (ref 1.8–7.7)
NEUTROPHILS NFR BLD: 66.3 % (ref 38–73)
PLATELET # BLD AUTO: 220 K/UL (ref 150–350)
PMV BLD AUTO: 10.4 FL (ref 9.2–12.9)
RBC # BLD AUTO: 4.12 M/UL (ref 4–5.4)
WBC # BLD AUTO: 10.26 K/UL (ref 3.9–12.7)

## 2019-12-04 PROCEDURE — 99999 PR PBB SHADOW E&M-EST. PATIENT-LVL II: ICD-10-PCS | Mod: PBBFAC,,, | Performed by: OBSTETRICS & GYNECOLOGY

## 2019-12-04 PROCEDURE — 85025 COMPLETE CBC W/AUTO DIFF WBC: CPT

## 2019-12-04 PROCEDURE — 99212 OFFICE O/P EST SF 10 MIN: CPT | Mod: PBBFAC,TH,PO | Performed by: OBSTETRICS & GYNECOLOGY

## 2019-12-04 PROCEDURE — 87147 CULTURE TYPE IMMUNOLOGIC: CPT

## 2019-12-04 PROCEDURE — 36415 COLL VENOUS BLD VENIPUNCTURE: CPT

## 2019-12-04 PROCEDURE — 87184 SC STD DISK METHOD PER PLATE: CPT

## 2019-12-04 PROCEDURE — 87491 CHLMYD TRACH DNA AMP PROBE: CPT

## 2019-12-04 PROCEDURE — 99999 PR PBB SHADOW E&M-EST. PATIENT-LVL II: CPT | Mod: PBBFAC,,, | Performed by: OBSTETRICS & GYNECOLOGY

## 2019-12-04 PROCEDURE — 87081 CULTURE SCREEN ONLY: CPT

## 2019-12-04 PROCEDURE — 99214 PR OFFICE/OUTPT VISIT, EST, LEVL IV, 30-39 MIN: ICD-10-PCS | Mod: S$PBB,TH,, | Performed by: OBSTETRICS & GYNECOLOGY

## 2019-12-04 PROCEDURE — 86703 HIV-1/HIV-2 1 RESULT ANTBDY: CPT

## 2019-12-04 PROCEDURE — 99214 OFFICE O/P EST MOD 30 MIN: CPT | Mod: S$PBB,TH,, | Performed by: OBSTETRICS & GYNECOLOGY

## 2019-12-04 PROCEDURE — 86592 SYPHILIS TEST NON-TREP QUAL: CPT

## 2019-12-04 NOTE — PROGRESS NOTES
Insufficient PNC - has not been seen since 26wga.  Today only c/o vaginal pressure.  Heartburn improved with protonix.  No ctx/vb/lof.  +FM.  Not taking PNV.       A/P:  35yo  at 37w5d   - High risk due to grandmultiparity and anxiety, stable off meds  - Insufficient PNC.  Discussed importance of compliance with prenatal visits.  - GBS/3T labs today.    - Counseling done, labor precautions given, all questions answered.  RTC 1 wks for OB visit; will schedule growth US asap.

## 2019-12-05 LAB
C TRACH DNA SPEC QL NAA+PROBE: NOT DETECTED
HIV 1+2 AB+HIV1 P24 AG SERPL QL IA: NEGATIVE
N GONORRHOEA DNA SPEC QL NAA+PROBE: NOT DETECTED
RPR SER QL: NORMAL

## 2019-12-09 LAB — BACTERIA SPEC AEROBE CULT: ABNORMAL

## 2019-12-17 ENCOUNTER — ROUTINE PRENATAL (OUTPATIENT)
Dept: OBSTETRICS AND GYNECOLOGY | Facility: CLINIC | Age: 34
End: 2019-12-17
Payer: MEDICAID

## 2019-12-17 ENCOUNTER — PROCEDURE VISIT (OUTPATIENT)
Dept: OBSTETRICS AND GYNECOLOGY | Facility: CLINIC | Age: 34
End: 2019-12-17
Payer: MEDICAID

## 2019-12-17 VITALS
SYSTOLIC BLOOD PRESSURE: 130 MMHG | BODY MASS INDEX: 26.15 KG/M2 | DIASTOLIC BLOOD PRESSURE: 80 MMHG | WEIGHT: 152.31 LBS

## 2019-12-17 DIAGNOSIS — O09.32 INSUFFICIENT PRENATAL CARE IN SECOND TRIMESTER: ICD-10-CM

## 2019-12-17 DIAGNOSIS — Z64.1 GRAND MULTIPARITY: ICD-10-CM

## 2019-12-17 DIAGNOSIS — O09.33 INSUFFICIENT PRENATAL CARE IN THIRD TRIMESTER: Primary | ICD-10-CM

## 2019-12-17 PROCEDURE — 76816 OB US FOLLOW-UP PER FETUS: CPT | Mod: 26,S$PBB,, | Performed by: PEDIATRICS

## 2019-12-17 PROCEDURE — 76816 PR  US,PREGNANT UTERUS,F/U,TRANSABD APP: ICD-10-PCS | Mod: 26,S$PBB,, | Performed by: PEDIATRICS

## 2019-12-17 PROCEDURE — 99999 PR PBB SHADOW E&M-EST. PATIENT-LVL II: ICD-10-PCS | Mod: PBBFAC,,, | Performed by: OBSTETRICS & GYNECOLOGY

## 2019-12-17 PROCEDURE — 99214 OFFICE O/P EST MOD 30 MIN: CPT | Mod: S$PBB,TH,, | Performed by: OBSTETRICS & GYNECOLOGY

## 2019-12-17 PROCEDURE — 99499 UNLISTED E&M SERVICE: CPT | Mod: S$PBB,,, | Performed by: PEDIATRICS

## 2019-12-17 PROCEDURE — 76816 OB US FOLLOW-UP PER FETUS: CPT | Mod: PBBFAC,PO | Performed by: PEDIATRICS

## 2019-12-17 PROCEDURE — 99212 OFFICE O/P EST SF 10 MIN: CPT | Mod: PBBFAC,TH,PO,25 | Performed by: OBSTETRICS & GYNECOLOGY

## 2019-12-17 PROCEDURE — 99499 NO LOS: ICD-10-PCS | Mod: S$PBB,,, | Performed by: PEDIATRICS

## 2019-12-17 PROCEDURE — 99999 PR PBB SHADOW E&M-EST. PATIENT-LVL II: CPT | Mod: PBBFAC,,, | Performed by: OBSTETRICS & GYNECOLOGY

## 2019-12-17 PROCEDURE — 99214 PR OFFICE/OUTPT VISIT, EST, LEVL IV, 30-39 MIN: ICD-10-PCS | Mod: S$PBB,TH,, | Performed by: OBSTETRICS & GYNECOLOGY

## 2019-12-17 NOTE — PROGRESS NOTES
Insufficient PNC, missed last week's visit.  Overall doing well today, no complaints.  No ctx/vb/lof.  +FM.  Not taking PNV.     Growth US: 43% (7#7), cephalic.    A/P:  35yo  at 39w4d   - High risk due to grandmultiparity and anxiety, stable off meds  - Insufficient PNC.  Discussed importance of compliance with prenatal visits.  - GBS positive, will need PCN in labor.  - Counseling done, labor precautions given, all questions answered.  Declined IOL.  RTC Monday for OB visit and cervical check.  Will plan IOL at 41wga if not in labor sooner.

## 2019-12-18 ENCOUNTER — ANESTHESIA (OUTPATIENT)
Dept: OBSTETRICS AND GYNECOLOGY | Facility: HOSPITAL | Age: 34
End: 2019-12-18
Payer: MEDICAID

## 2019-12-18 ENCOUNTER — HOSPITAL ENCOUNTER (INPATIENT)
Facility: HOSPITAL | Age: 34
LOS: 2 days | Discharge: HOME | End: 2019-12-20
Attending: OBSTETRICS & GYNECOLOGY | Admitting: OBSTETRICS & GYNECOLOGY
Payer: MEDICAID

## 2019-12-18 ENCOUNTER — ANESTHESIA EVENT (OUTPATIENT)
Dept: OBSTETRICS AND GYNECOLOGY | Facility: HOSPITAL | Age: 34
End: 2019-12-18
Payer: MEDICAID

## 2019-12-18 DIAGNOSIS — Z3A.39 39 WEEKS GESTATION OF PREGNANCY: ICD-10-CM

## 2019-12-18 PROBLEM — J45.20 MILD INTERMITTENT ASTHMA WITHOUT COMPLICATION: Status: RESOLVED | Noted: 2017-10-19 | Resolved: 2019-12-18

## 2019-12-18 PROBLEM — K42.9 UMBILICAL HERNIA WITHOUT OBSTRUCTION AND WITHOUT GANGRENE: Status: RESOLVED | Noted: 2017-10-19 | Resolved: 2019-12-18

## 2019-12-18 PROBLEM — R11.2 NON-INTRACTABLE VOMITING WITH NAUSEA: Status: RESOLVED | Noted: 2019-11-20 | Resolved: 2019-12-18

## 2019-12-18 PROBLEM — R10.33 PERIUMBILICAL ABDOMINAL PAIN: Status: RESOLVED | Noted: 2017-10-19 | Resolved: 2019-12-18

## 2019-12-18 LAB
ABO + RH BLD: NORMAL
ALBUMIN SERPL BCP-MCNC: 2.9 G/DL (ref 3.5–5.2)
ALP SERPL-CCNC: 313 U/L (ref 55–135)
ALT SERPL W/O P-5'-P-CCNC: 11 U/L (ref 10–44)
ANION GAP SERPL CALC-SCNC: 13 MMOL/L (ref 8–16)
AST SERPL-CCNC: 15 U/L (ref 10–40)
BASOPHILS # BLD AUTO: 0.03 K/UL (ref 0–0.2)
BASOPHILS NFR BLD: 0.3 % (ref 0–1.9)
BILIRUB SERPL-MCNC: 1.1 MG/DL (ref 0.1–1)
BLD GP AB SCN CELLS X3 SERPL QL: NORMAL
BUN SERPL-MCNC: 13 MG/DL (ref 6–20)
CALCIUM SERPL-MCNC: 8.8 MG/DL (ref 8.7–10.5)
CHLORIDE SERPL-SCNC: 104 MMOL/L (ref 95–110)
CO2 SERPL-SCNC: 20 MMOL/L (ref 23–29)
CREAT SERPL-MCNC: 0.7 MG/DL (ref 0.5–1.4)
DIFFERENTIAL METHOD: ABNORMAL
EOSINOPHIL # BLD AUTO: 0.1 K/UL (ref 0–0.5)
EOSINOPHIL NFR BLD: 0.8 % (ref 0–8)
ERYTHROCYTE [DISTWIDTH] IN BLOOD BY AUTOMATED COUNT: 13.1 % (ref 11.5–14.5)
EST. GFR  (AFRICAN AMERICAN): >60 ML/MIN/1.73 M^2
EST. GFR  (NON AFRICAN AMERICAN): >60 ML/MIN/1.73 M^2
GLUCOSE SERPL-MCNC: 78 MG/DL (ref 70–110)
HBV SURFACE AG SERPL QL IA: NEGATIVE
HCT VFR BLD AUTO: 36.2 % (ref 37–48.5)
HGB BLD-MCNC: 11.8 G/DL (ref 12–16)
HIV 1+2 AB+HIV1 P24 AG SERPL QL IA: NEGATIVE
HIV1+2 IGG SERPL QL IA.RAPID: NEGATIVE
LYMPHOCYTES # BLD AUTO: 3.6 K/UL (ref 1–4.8)
LYMPHOCYTES NFR BLD: 32.3 % (ref 18–48)
MCH RBC QN AUTO: 28.6 PG (ref 27–31)
MCHC RBC AUTO-ENTMCNC: 32.6 G/DL (ref 32–36)
MCV RBC AUTO: 88 FL (ref 82–98)
MONOCYTES # BLD AUTO: 0.7 K/UL (ref 0.3–1)
MONOCYTES NFR BLD: 6.1 % (ref 4–15)
NEUTROPHILS # BLD AUTO: 6.8 K/UL (ref 1.8–7.7)
NEUTROPHILS NFR BLD: 60.5 % (ref 38–73)
PLATELET # BLD AUTO: 185 K/UL (ref 150–350)
PMV BLD AUTO: 10.8 FL (ref 9.2–12.9)
POTASSIUM SERPL-SCNC: 4.3 MMOL/L (ref 3.5–5.1)
PROT SERPL-MCNC: 6 G/DL (ref 6–8.4)
RBC # BLD AUTO: 4.12 M/UL (ref 4–5.4)
RPR SER QL: NORMAL
SODIUM SERPL-SCNC: 137 MMOL/L (ref 136–145)
WBC # BLD AUTO: 11.23 K/UL (ref 3.9–12.7)

## 2019-12-18 PROCEDURE — 63600175 PHARM REV CODE 636 W HCPCS: Performed by: OBSTETRICS & GYNECOLOGY

## 2019-12-18 PROCEDURE — 72100002 HC LABOR CARE, 1ST 8 HOURS

## 2019-12-18 PROCEDURE — 51702 INSERT TEMP BLADDER CATH: CPT

## 2019-12-18 PROCEDURE — 25000003 PHARM REV CODE 250: Performed by: OBSTETRICS & GYNECOLOGY

## 2019-12-18 PROCEDURE — 11000001 HC ACUTE MED/SURG PRIVATE ROOM

## 2019-12-18 PROCEDURE — 72100003 HC LABOR CARE, EA. ADDL. 8 HRS

## 2019-12-18 PROCEDURE — 86592 SYPHILIS TEST NON-TREP QUAL: CPT

## 2019-12-18 PROCEDURE — 59409 PR OBSTETRICAL CARE,VAG DELIV ONLY: ICD-10-PCS | Mod: GB,,, | Performed by: OBSTETRICS & GYNECOLOGY

## 2019-12-18 PROCEDURE — 72200005 HC VAGINAL DELIVERY LEVEL II

## 2019-12-18 PROCEDURE — 87340 HEPATITIS B SURFACE AG IA: CPT

## 2019-12-18 PROCEDURE — 86703 HIV-1/HIV-2 1 RESULT ANTBDY: CPT | Mod: 91

## 2019-12-18 PROCEDURE — 62326 NJX INTERLAMINAR LMBR/SAC: CPT | Performed by: HEALTH CARE PROVIDER

## 2019-12-18 PROCEDURE — 85025 COMPLETE CBC W/AUTO DIFF WBC: CPT

## 2019-12-18 PROCEDURE — 36415 COLL VENOUS BLD VENIPUNCTURE: CPT

## 2019-12-18 PROCEDURE — 25000003 PHARM REV CODE 250: Performed by: HEALTH CARE PROVIDER

## 2019-12-18 PROCEDURE — 86901 BLOOD TYPING SEROLOGIC RH(D): CPT

## 2019-12-18 PROCEDURE — 59409 OBSTETRICAL CARE: CPT | Mod: GB,,, | Performed by: OBSTETRICS & GYNECOLOGY

## 2019-12-18 PROCEDURE — 27800516 HC TRAY, EPIDURAL COMBO: Performed by: ANESTHESIOLOGY

## 2019-12-18 PROCEDURE — 86703 HIV-1/HIV-2 1 RESULT ANTBDY: CPT

## 2019-12-18 PROCEDURE — 80053 COMPREHEN METABOLIC PANEL: CPT

## 2019-12-18 PROCEDURE — 27200710 HC EPIDURAL INFUSION PUMP SET: Performed by: ANESTHESIOLOGY

## 2019-12-18 RX ORDER — DIPHENHYDRAMINE HYDROCHLORIDE 50 MG/ML
25 INJECTION INTRAMUSCULAR; INTRAVENOUS EVERY 4 HOURS PRN
Status: DISCONTINUED | OUTPATIENT
Start: 2019-12-18 | End: 2019-12-20 | Stop reason: HOSPADM

## 2019-12-18 RX ORDER — SODIUM CHLORIDE 9 MG/ML
INJECTION, SOLUTION INTRAVENOUS
Status: DISCONTINUED | OUTPATIENT
Start: 2019-12-18 | End: 2019-12-18

## 2019-12-18 RX ORDER — ONDANSETRON 8 MG/1
8 TABLET, ORALLY DISINTEGRATING ORAL EVERY 8 HOURS PRN
Status: DISCONTINUED | OUTPATIENT
Start: 2019-12-18 | End: 2019-12-20 | Stop reason: HOSPADM

## 2019-12-18 RX ORDER — IBUPROFEN 600 MG/1
600 TABLET ORAL EVERY 6 HOURS
Status: DISCONTINUED | OUTPATIENT
Start: 2019-12-18 | End: 2019-12-20 | Stop reason: HOSPADM

## 2019-12-18 RX ORDER — SODIUM CHLORIDE, SODIUM LACTATE, POTASSIUM CHLORIDE, CALCIUM CHLORIDE 600; 310; 30; 20 MG/100ML; MG/100ML; MG/100ML; MG/100ML
INJECTION, SOLUTION INTRAVENOUS CONTINUOUS
Status: DISCONTINUED | OUTPATIENT
Start: 2019-12-18 | End: 2019-12-18

## 2019-12-18 RX ORDER — PRENATAL WITH FERROUS FUM AND FOLIC ACID 3080; 920; 120; 400; 22; 1.84; 3; 20; 10; 1; 12; 200; 27; 25; 2 [IU]/1; [IU]/1; MG/1; [IU]/1; MG/1; MG/1; MG/1; MG/1; MG/1; MG/1; UG/1; MG/1; MG/1; MG/1; MG/1
1 TABLET ORAL DAILY
Status: DISCONTINUED | OUTPATIENT
Start: 2019-12-18 | End: 2019-12-20 | Stop reason: HOSPADM

## 2019-12-18 RX ORDER — CALCIUM CARBONATE 200(500)MG
500 TABLET,CHEWABLE ORAL 3 TIMES DAILY PRN
Status: DISCONTINUED | OUTPATIENT
Start: 2019-12-18 | End: 2019-12-18

## 2019-12-18 RX ORDER — HYDROCODONE BITARTRATE AND ACETAMINOPHEN 5; 325 MG/1; MG/1
1 TABLET ORAL EVERY 4 HOURS PRN
Status: DISCONTINUED | OUTPATIENT
Start: 2019-12-18 | End: 2019-12-20 | Stop reason: HOSPADM

## 2019-12-18 RX ORDER — ACETAMINOPHEN 325 MG/1
650 TABLET ORAL EVERY 6 HOURS PRN
Status: DISCONTINUED | OUTPATIENT
Start: 2019-12-18 | End: 2019-12-19

## 2019-12-18 RX ORDER — DIPHENHYDRAMINE HCL 25 MG
25 CAPSULE ORAL EVERY 4 HOURS PRN
Status: DISCONTINUED | OUTPATIENT
Start: 2019-12-18 | End: 2019-12-20 | Stop reason: HOSPADM

## 2019-12-18 RX ORDER — PRENATAL WITH FERROUS FUM AND FOLIC ACID 3080; 920; 120; 400; 22; 1.84; 3; 20; 10; 1; 12; 200; 27; 25; 2 [IU]/1; [IU]/1; MG/1; [IU]/1; MG/1; MG/1; MG/1; MG/1; MG/1; MG/1; UG/1; MG/1; MG/1; MG/1; MG/1
1 TABLET ORAL DAILY
Status: DISCONTINUED | OUTPATIENT
Start: 2019-12-18 | End: 2019-12-18 | Stop reason: SDUPTHER

## 2019-12-18 RX ORDER — ONDANSETRON 8 MG/1
8 TABLET, ORALLY DISINTEGRATING ORAL EVERY 8 HOURS PRN
Status: DISCONTINUED | OUTPATIENT
Start: 2019-12-18 | End: 2019-12-18

## 2019-12-18 RX ORDER — SIMETHICONE 80 MG
1 TABLET,CHEWABLE ORAL 4 TIMES DAILY PRN
Status: DISCONTINUED | OUTPATIENT
Start: 2019-12-18 | End: 2019-12-18

## 2019-12-18 RX ORDER — OXYTOCIN/RINGER'S LACTATE 30/500 ML
2 PLASTIC BAG, INJECTION (ML) INTRAVENOUS CONTINUOUS
Status: DISCONTINUED | OUTPATIENT
Start: 2019-12-18 | End: 2019-12-18

## 2019-12-18 RX ORDER — OXYTOCIN/RINGER'S LACTATE 30/500 ML
334 PLASTIC BAG, INJECTION (ML) INTRAVENOUS ONCE
Status: COMPLETED | OUTPATIENT
Start: 2019-12-18 | End: 2019-12-18

## 2019-12-18 RX ORDER — IBUPROFEN 600 MG/1
600 TABLET ORAL EVERY 6 HOURS
Status: DISCONTINUED | OUTPATIENT
Start: 2019-12-18 | End: 2019-12-18

## 2019-12-18 RX ORDER — OXYTOCIN/RINGER'S LACTATE 30/500 ML
95 PLASTIC BAG, INJECTION (ML) INTRAVENOUS ONCE
Status: DISCONTINUED | OUTPATIENT
Start: 2019-12-18 | End: 2019-12-18

## 2019-12-18 RX ORDER — SODIUM CHLORIDE 9 MG/ML
INJECTION, SOLUTION INTRAVENOUS CONTINUOUS
Status: DISCONTINUED | OUTPATIENT
Start: 2019-12-18 | End: 2019-12-18

## 2019-12-18 RX ORDER — FENTANYL/ROPIVACAINE/NS/PF 2MCG/ML-.1
PLASTIC BAG, INJECTION (ML) EPIDURAL
Status: DISPENSED
Start: 2019-12-18 | End: 2019-12-18

## 2019-12-18 RX ORDER — SIMETHICONE 80 MG
1 TABLET,CHEWABLE ORAL EVERY 6 HOURS PRN
Status: DISCONTINUED | OUTPATIENT
Start: 2019-12-18 | End: 2019-12-20 | Stop reason: HOSPADM

## 2019-12-18 RX ORDER — OXYTOCIN/RINGER'S LACTATE 30/500 ML
95 PLASTIC BAG, INJECTION (ML) INTRAVENOUS ONCE
Status: COMPLETED | OUTPATIENT
Start: 2019-12-18 | End: 2019-12-18

## 2019-12-18 RX ORDER — DOCUSATE SODIUM 100 MG/1
200 CAPSULE, LIQUID FILLED ORAL 2 TIMES DAILY PRN
Status: DISCONTINUED | OUTPATIENT
Start: 2019-12-18 | End: 2019-12-20 | Stop reason: HOSPADM

## 2019-12-18 RX ADMIN — Medication 5 MILLION UNITS: at 03:12

## 2019-12-18 RX ADMIN — IBUPROFEN 600 MG: 600 TABLET, FILM COATED ORAL at 02:12

## 2019-12-18 RX ADMIN — Medication 12 ML/HR: at 04:12

## 2019-12-18 RX ADMIN — HYDROCODONE BITARTRATE AND ACETAMINOPHEN 1 TABLET: 5; 325 TABLET ORAL at 03:12

## 2019-12-18 RX ADMIN — DEXTROSE 2.5 MILLION UNITS: 50 INJECTION, SOLUTION INTRAVENOUS at 07:12

## 2019-12-18 RX ADMIN — Medication 95 MILLI-UNITS/MIN: at 01:12

## 2019-12-18 RX ADMIN — SODIUM CHLORIDE, SODIUM LACTATE, POTASSIUM CHLORIDE, AND CALCIUM CHLORIDE: .6; .31; .03; .02 INJECTION, SOLUTION INTRAVENOUS at 07:12

## 2019-12-18 RX ADMIN — HYDROCODONE BITARTRATE AND ACETAMINOPHEN 1 TABLET: 5; 325 TABLET ORAL at 08:12

## 2019-12-18 RX ADMIN — PRENATAL VIT W/ FE FUMARATE-FA TAB 27-0.8 MG 1 TABLET: 27-0.8 TAB at 02:12

## 2019-12-18 RX ADMIN — SODIUM CHLORIDE, SODIUM LACTATE, POTASSIUM CHLORIDE, AND CALCIUM CHLORIDE 1000 ML: .6; .31; .03; .02 INJECTION, SOLUTION INTRAVENOUS at 04:12

## 2019-12-18 RX ADMIN — IBUPROFEN 600 MG: 600 TABLET, FILM COATED ORAL at 08:12

## 2019-12-18 RX ADMIN — Medication 334 MILLI-UNITS/MIN: at 12:12

## 2019-12-18 RX ADMIN — ONDANSETRON 8 MG: 8 TABLET, ORALLY DISINTEGRATING ORAL at 02:12

## 2019-12-18 RX ADMIN — Medication 2 MILLI-UNITS/MIN: at 10:12

## 2019-12-18 NOTE — H&P
[facility]  Obstetrics  History & Physical    Patient Name: Vani Hernandez  MRN: [id number]  Admission Date: 2019  Primary Care Provider: Guevara Rodriguez MD    Subjective:     Principal Problem: active labor    History of Present Illness: 35yo  presents to L&D at 39w5d with c/o ctx q 3 min.  No vb/lof.  Good fetal movement.       This pregnancy has been complicated by insufficient prenatal care and abnormal quad screen, normal MaterniT 21.    OB History    Para Term  AB Living   7 5 5 0 1 5   SAB TAB Ectopic Multiple Live Births   1 0 0 0 5      # Outcome Date GA Lbr Abdoulaye/2nd Weight Sex Delivery Anes PTL Lv   7 Current            6 SAB      SAB      5 Term  40w0d  3.175 kg (7 lb) M    ARIANA   4 Term  40w0d  3.175 kg (7 lb) M Vag-Spont EPI N ARIANA   3 Term  40w0d   F Vag-Spont EPI  ARIANA   2 Term  40w0d    Vag-Spont EPI  ARIANA   1 Term  40w0d    Vag-Spont EPI  ARIANA     Past Medical History:   Diagnosis Date    Asthma     GERD (gastroesophageal reflux disease)      Past Surgical History:   Procedure Laterality Date    HERNIA REPAIR         PTA Medications   Medication Sig    pantoprazole (PROTONIX) 40 MG tablet Take 1 tablet (40 mg total) by mouth once daily.    PRENATAL 19 29 mg iron- 1 mg Chew     prenatal vit no.112-folate no6 1 mg Chew Take 1 tablet by mouth once daily.    UNKNOWN TO PATIENT        Review of patient's allergies indicates:  No Known Allergies     Family History     Problem Relation (Age of Onset)    Diabetes Mother    No Known Problems Father        Tobacco Use    Smoking status: Former Smoker    Smokeless tobacco: Never Used   Substance and Sexual Activity    Alcohol use: Not Currently    Drug use: No    Sexual activity: Yes     Partners: Male     Birth control/protection: None     Review of Systems : neg x 10, except as per HPI    Objective:     Vital Signs (Most Recent):  Temp: 97.6 °F (36.4 °C) (19 0756)  Pulse: (!) 59  (19 0852)  Resp: 18 (19 0756)  BP: 135/89 (19 0852) Vital Signs (24h Range):  Temp:  [97.6 °F (36.4 °C)] 97.6 °F (36.4 °C)  Pulse:  [48-69] 59  Resp:  [18] 18  BP: (108-142)/(68-89) 135/89        FHT: Cat 1 (reassuring)  TOCO: Q 3 minutes    Physical Exam    Cervix:  Dilation:  5  Effacement:  90%  Station: -2  Presentation: Vertex     Gen: NAD  Resp: Normal respiratory effort  Abd: soft, NT  Pelvic: as above.  SROM, clear, now /-2.   Ext: normal ROM  Psych: appropriate affect  Neuro: grossly intact      Significant Labs:  Lab Results   Component Value Date    GROUPTRH B POS 2019    HEPBSAG Negative 2019    STREPBCULT (A) 2019     STREPTOCOCCUS AGALACTIAE (GROUP B)  Beta-hemolytic streptococci are routinely susceptible to   penicillins,cephalosporins and carbapenems.         CBC:   Recent Labs   Lab 19  0311   WBC 11.23   RBC 4.12   HGB 11.8*   HCT 36.2*      MCV 88   MCH 28.6   MCHC 32.6     I have personallly reviewed all pertinent lab results from the last 24 hours.    Assessment/Plan:     34 y.o. female  at 39w5d in active labor.  - Admit to L&D for management of labor.  - PCN for GBS ppx.  - Epidural for pain control.  - Expect .    Ann Parekh MD  Obstetrics  [facility]

## 2019-12-18 NOTE — ANESTHESIA PREPROCEDURE EVALUATION
2019  Vani Hernandez is a 34 y.o., female  at 39+ weeks who presents with painful contractions.    Anesthesia Evaluation    I have reviewed the Patient Summary Reports.    I have reviewed the Nursing Notes.   I have reviewed the Medications.     Review of Systems  Anesthesia Hx:  No problems with previous Anesthesia  History of prior surgery of interest to airway management or planning: Denies Family Hx of Anesthesia complications.   Denies Personal Hx of Anesthesia complications.   Hematology/Oncology:  Hematology Normal   Oncology Normal     EENT/Dental:EENT/Dental Normal   Cardiovascular:  Cardiovascular Normal Exercise tolerance: good     Pulmonary:  Pulmonary Normal    Renal/:  Renal/ Normal     Hepatic/GI:  Hepatic/GI Normal    Musculoskeletal:  Musculoskeletal Normal    Neurological:  Neurology Normal    Dermatological:  Skin Normal    Psych:  Psychiatric Normal           Physical Exam  General:  Well nourished    Airway/Jaw/Neck:  Airway Findings: Mouth Opening: Normal Tongue: Normal  General Airway Assessment: Adult  Mallampati: II  Improves to II with phonation.  TM Distance: < 4 cm     Eyes/Ears/Nose:  EYES/EARS/NOSE FINDINGS: Normal   Dental:  Dental Findings: In tact   Chest/Lungs:  Chest/Lungs Clear    Heart/Vascular:  Heart Findings: Normal       Mental Status:  Mental Status Findings: Normal        Anesthesia Plan  Type of Anesthesia, risks & benefits discussed:  Anesthesia Type:  spinal, CSE, epidural  Patient's Preference:   Intra-op Monitoring Plan:   Intra-op Monitoring Plan Comments:   Post Op Pain Control Plan:   Post Op Pain Control Plan Comments:   Induction:   IV  Beta Blocker:  Patient is not currently on a Beta-Blocker (No further documentation required).       Informed Consent: Patient understands risks and agrees with Anesthesia plan.  Questions answered. Anesthesia  consent signed with patient.  ASA Score: 2  emergent   Day of Surgery Review of History & Physical:  There are no significant changes.          Ready For Surgery From Anesthesia Perspective.

## 2019-12-18 NOTE — PLAN OF CARE
Received report around 1515 when pt transferred to M/B unit.  POC reviewed with pt around that time; verbalized acceptance and understanding.  Pt's VS stable.  Voiding without difficulty.  Remains free from falls and injury.  Pain controlled with ordered meds.  Bonding well with baby.  WCTM.

## 2019-12-18 NOTE — L&D DELIVERY NOTE
[facility]  Vaginal Delivery   Operative Note    SUMMARY     Normal spontaneous vaginal delivery of live infant, was placed on mothers abdomen for skin to skin and bulb suctioning performed.  Infant delivered position FAHEEM over intact perineum.  Nuchal cord: No.    Spontaneous delivery of placenta and IV pitocin given noting good uterine tone.  No lacerations noted.  Patient tolerated delivery well. Sponge needle and lap counted correctly x2.    Indications:  (normal spontaneous vaginal delivery)  Pregnancy complicated by:   Patient Active Problem List   Diagnosis     (normal spontaneous vaginal delivery)    39 weeks gestation of pregnancy     Admitting GA: 39w5d    Delivery Information for Augie Hernandez    Birth information:  YOB: 2019   Time of birth: 12:28 PM   Sex: male   Head Delivery Date/Time: 2019 12:28 PM   Delivery type: Vaginal, Spontaneous   Gestational Age: 39w5d    Delivery Providers    Delivering clinician:  Ann Parekh MD   Provider Role    Aurea Ruano RN Delivery Nurse    Mae Pierson RN Registered Nurse    Benjamin Fall RN Registered Nurse    Pat Jackson Surgical Tech            Measurements    Weight:    Length:           Apgars    Living status:  Living  Apgars:   1 min.:   5 min.:   10 min.:   15 min.:   20 min.:     Skin color:   1  1       Heart rate:   2  2       Reflex irritability:   2  2       Muscle tone:   2  2       Respiratory effort:   2  2       Total:   9  9       Apgars assigned by:  BENJAMIN PIKE         Operative Delivery    Forceps attempted?:  No  Vacuum extractor attempted?:  No         Shoulder Dystocia    Shoulder dystocia present?:  No           Presentation    Presentation:  Vertex  Position:  Left Occiput Anterior           Interventions/Resuscitation    Method:  Bulb Suctioning, Tactile Stimulation       Cord    Vessels:  3 vessels  Complications:  None  Delayed Cord Clamping?:  Yes  Cord Clamped  Date/Time:  2019 12:30 PM  Cord Blood Disposition:  Sent with Baby  Gases Sent?:  No  Stem Cell Collection (by MD):  No       Placenta    Placenta delivery date/time:  2019 1233  Placenta removal:  Spontaneous  Placenta appearance:  Intact  Placenta disposition:  discarded           Labor Events:       labor: No     Labor Onset Date/Time:         Dilation Complete Date/Time:         Start Pushing Date/Time:       Rupture Date/Time:              Rupture type:           Fluid Amount:        Fluid Color:        Fluid Odor:        Membrane Status (PeriCalm): SRM (Spontaneous Rupture)      Rupture Date/Time (PeriCalm): 2019 10:40:00      Fluid Amount (PeriCalm): Large      Fluid Color (PeriCalm): Clear       steroids: None     Antibiotics given for GBS: Yes     Induction: none     Indications for induction:        Augmentation: oxytocin     Indications for augmentation:       Labor complications: None     Additional complications:          Cervical ripening:                     Delivery:      Episiotomy: None     Indication for Episiotomy:       Perineal Lacerations: None Repaired:      Periurethral Laceration:   Repaired:     Labial Laceration:   Repaired:     Sulcus Laceration:   Repaired:     Vaginal Laceration:   Repaired:     Cervical Laceration:   Repaired:     Repair suture:       Repair # of packets:       Last Value - EBL - Nursing (mL):       Sum - EBL - Nursing (mL): 0     Last Value - EBL - Anesthesia (mL):      Calculated QBL (mL): 250      Vaginal Sweep Performed:       Surgicount Correct:         Other providers:       Anesthesia    Method:  Spinal, Epidural          Details (if applicable):  Trial of Labor      Categorization:      Priority:     Indications for :     Incision Type:       Additional  information:  Forceps:    Vacuum:    Breech:    Observed anomalies    Other (Comments):

## 2019-12-18 NOTE — ANESTHESIA PROCEDURE NOTES
CSE    Patient location during procedure: OB  Start time: 12/18/2019 3:46 AM  Timeout: 12/18/2019 3:46 AM  End time: 12/18/2019 4:05 AM    Staffing  Authorizing Provider: Jodi Squires MD  Performing Provider: Tian Blanton MD    Preanesthetic Checklist  Completed: patient identified, site marked, surgical consent, pre-op evaluation, timeout performed, IV checked, risks and benefits discussed and monitors and equipment checked  CSE  Patient position: sitting  Prep: ChloraPrep  Patient monitoring: continuous pulse ox, cardiac monitor, heart rate and frequent blood pressure checks  Approach: midline  Spinal Needle  Needle type: pencil-tip   Needle gauge: 24 G  Needle length: 3.5 in  Epidural Needle  Injection technique: TANYA saline  Needle type: Tuohy   Needle gauge: 17 G  Needle length: 3.5 in  Needle insertion depth: 6 cm  Location: L3-4  Needle localization: anatomical landmarks  Catheter  Catheter type: springwound  Catheter size: 18 G  Catheter at skin depth: 11 cm  Test dose: lidocaine 1.5% with Epi 1-to-200,000  Additional Documentation: incremental injection, negative aspiration for CSF, negative aspiration for heme and no paresthesia on injection  Assessment  Sensory level: T8   Dermatomal levels determined by pinch or prick

## 2019-12-18 NOTE — NURSING
Patient up to void, able to ambulate and void without difficulty. Daphnie care done. Patient sitting up in bed.   Dr. Parekh called and aware of elevated blood pressures, states to transfer patient to postpartum and continue to monitor VS as ordered.

## 2019-12-19 PROBLEM — Z3A.39 39 WEEKS GESTATION OF PREGNANCY: Status: RESOLVED | Noted: 2019-12-18 | Resolved: 2019-12-19

## 2019-12-19 LAB
BASOPHILS # BLD AUTO: 0.03 K/UL (ref 0–0.2)
BASOPHILS NFR BLD: 0.2 % (ref 0–1.9)
DIFFERENTIAL METHOD: ABNORMAL
EOSINOPHIL # BLD AUTO: 0.1 K/UL (ref 0–0.5)
EOSINOPHIL NFR BLD: 0.5 % (ref 0–8)
ERYTHROCYTE [DISTWIDTH] IN BLOOD BY AUTOMATED COUNT: 12.9 % (ref 11.5–14.5)
HCT VFR BLD AUTO: 35.8 % (ref 37–48.5)
HGB BLD-MCNC: 11.5 G/DL (ref 12–16)
LYMPHOCYTES # BLD AUTO: 2.8 K/UL (ref 1–4.8)
LYMPHOCYTES NFR BLD: 20.9 % (ref 18–48)
MCH RBC QN AUTO: 28.2 PG (ref 27–31)
MCHC RBC AUTO-ENTMCNC: 32.1 G/DL (ref 32–36)
MCV RBC AUTO: 88 FL (ref 82–98)
MONOCYTES # BLD AUTO: 0.7 K/UL (ref 0.3–1)
MONOCYTES NFR BLD: 5.6 % (ref 4–15)
NEUTROPHILS # BLD AUTO: 9.7 K/UL (ref 1.8–7.7)
NEUTROPHILS NFR BLD: 72.8 % (ref 38–73)
PLATELET # BLD AUTO: 167 K/UL (ref 150–350)
PMV BLD AUTO: 10.4 FL (ref 9.2–12.9)
RBC # BLD AUTO: 4.08 M/UL (ref 4–5.4)
WBC # BLD AUTO: 13.3 K/UL (ref 3.9–12.7)

## 2019-12-19 PROCEDURE — 99231 PR SUBSEQUENT HOSPITAL CARE,LEVL I: ICD-10-PCS | Mod: ,,, | Performed by: OBSTETRICS & GYNECOLOGY

## 2019-12-19 PROCEDURE — 63600175 PHARM REV CODE 636 W HCPCS: Performed by: OBSTETRICS & GYNECOLOGY

## 2019-12-19 PROCEDURE — 90715 TDAP VACCINE 7 YRS/> IM: CPT | Performed by: OBSTETRICS & GYNECOLOGY

## 2019-12-19 PROCEDURE — 90471 IMMUNIZATION ADMIN: CPT | Performed by: OBSTETRICS & GYNECOLOGY

## 2019-12-19 PROCEDURE — 25000003 PHARM REV CODE 250: Performed by: OBSTETRICS & GYNECOLOGY

## 2019-12-19 PROCEDURE — 90686 IIV4 VACC NO PRSV 0.5 ML IM: CPT | Performed by: OBSTETRICS & GYNECOLOGY

## 2019-12-19 PROCEDURE — 99231 SBSQ HOSP IP/OBS SF/LOW 25: CPT | Mod: ,,, | Performed by: OBSTETRICS & GYNECOLOGY

## 2019-12-19 PROCEDURE — 90472 IMMUNIZATION ADMIN EACH ADD: CPT | Performed by: OBSTETRICS & GYNECOLOGY

## 2019-12-19 PROCEDURE — 85025 COMPLETE CBC W/AUTO DIFF WBC: CPT

## 2019-12-19 PROCEDURE — 36415 COLL VENOUS BLD VENIPUNCTURE: CPT

## 2019-12-19 PROCEDURE — 11000001 HC ACUTE MED/SURG PRIVATE ROOM

## 2019-12-19 RX ORDER — ACETAMINOPHEN 500 MG
1000 TABLET ORAL ONCE
Status: COMPLETED | OUTPATIENT
Start: 2019-12-19 | End: 2019-12-19

## 2019-12-19 RX ORDER — ACETAMINOPHEN 325 MG/1
650 TABLET ORAL EVERY 6 HOURS
Status: DISCONTINUED | OUTPATIENT
Start: 2019-12-19 | End: 2019-12-20 | Stop reason: HOSPADM

## 2019-12-19 RX ADMIN — HYDROCODONE BITARTRATE AND ACETAMINOPHEN 1 TABLET: 5; 325 TABLET ORAL at 01:12

## 2019-12-19 RX ADMIN — HYDROCODONE BITARTRATE AND ACETAMINOPHEN 1 TABLET: 5; 325 TABLET ORAL at 05:12

## 2019-12-19 RX ADMIN — INFLUENZA VIRUS VACCINE 0.5 ML: 15; 15; 15; 15 SUSPENSION INTRAMUSCULAR at 05:12

## 2019-12-19 RX ADMIN — DOCUSATE SODIUM 200 MG: 100 CAPSULE, LIQUID FILLED ORAL at 11:12

## 2019-12-19 RX ADMIN — ACETAMINOPHEN 1000 MG: 500 TABLET, FILM COATED ORAL at 08:12

## 2019-12-19 RX ADMIN — CLOSTRIDIUM TETANI TOXOID ANTIGEN (FORMALDEHYDE INACTIVATED), CORYNEBACTERIUM DIPHTHERIAE TOXOID ANTIGEN (FORMALDEHYDE INACTIVATED), BORDETELLA PERTUSSIS TOXOID ANTIGEN (GLUTARALDEHYDE INACTIVATED), BORDETELLA PERTUSSIS FILAMENTOUS HEMAGGLUTININ ANTIGEN (FORMALDEHYDE INACTIVATED), BORDETELLA PERTUSSIS PERTACTIN ANTIGEN, AND BORDETELLA PERTUSSIS FIMBRIAE 2/3 ANTIGEN 0.5 ML: 5; 2; 2.5; 5; 3; 5 INJECTION, SUSPENSION INTRAMUSCULAR at 08:12

## 2019-12-19 RX ADMIN — IBUPROFEN 600 MG: 600 TABLET, FILM COATED ORAL at 05:12

## 2019-12-19 RX ADMIN — PRENATAL VIT W/ FE FUMARATE-FA TAB 27-0.8 MG 1 TABLET: 27-0.8 TAB at 08:12

## 2019-12-19 RX ADMIN — HYDROCODONE BITARTRATE AND ACETAMINOPHEN 1 TABLET: 5; 325 TABLET ORAL at 03:12

## 2019-12-19 RX ADMIN — HYDROCODONE BITARTRATE AND ACETAMINOPHEN 1 TABLET: 5; 325 TABLET ORAL at 08:12

## 2019-12-19 RX ADMIN — IBUPROFEN 600 MG: 600 TABLET, FILM COATED ORAL at 11:12

## 2019-12-19 RX ADMIN — HYDROCODONE BITARTRATE AND ACETAMINOPHEN 1 TABLET: 5; 325 TABLET ORAL at 11:12

## 2019-12-19 NOTE — PLAN OF CARE
No on this medication   Patient ambulating freely in room.  Lochia rubra &  Light.  Fundus firm and at umbilicus. Use of tucks pads, dermoplast, and scarlett bottle reviewed with patient; reports relief of perineal pain with use. Tolerating regular diet.  Voiding and passing flatus. Questions answered/encouraged; reassurance provided.  Pt states pain goal met with prescribed medications per MD.  Bonding with baby AEB holding, feeding, dressing, and changing baby's diaper. Smiles appropriately at baby. Family support noted at bedside. POC reviewed with pt; able to verbalize acceptance and understanding. VS stable. Call light in reach, side rails up x2, nonskid socks on, bed in lowest position with wheels locked.  Remains free from falls and/or injury. NAD noted.  Will continue to monitor.

## 2019-12-19 NOTE — PROGRESS NOTES
[facility]  Obstetrics  Postpartum Progress Note    Patient Name: Vani Hernandez  MRN: [id number]  Admission Date: 2019  Hospital Length of Stay: 1 days  Attending Physician: Ann Parekh MD  Primary Care Provider: Guevara Rodriguez MD    Subjective:     Principal Problem: (normal spontaneous vaginal delivery)    Subjective  PPD 1, s/p .  Patient doing well.  Lochia minimal.  Pain controlled with medication.  Ambulating and voiding without difficulty.  Tolerating regular diet.  Bottle feeding.    Objective  Vitals:    19 0800   BP: 122/82   Pulse: 66   Resp: 18   Temp: 97.6 °F (36.4 °C)       Gen: NAD  CV: RRR  Lungs: CTAB  Abd: Soft, NT, ND.  Fundus firm, NT, below umbilicus  Ext: No calf tenderness  Psych: appropriate affect  Neuro: Grossly intact    Recent Labs   Lab 19  0804   WBC 13.30*   RBC 4.08   HGB 11.5*   HCT 35.8*      MCV 88   MCH 28.2   MCHC 32.1       Assessment/Plan:     34 y.o. female  s/p , PPD 1, in stable condition.  - Continue routine postpartum care.   - Plan baby circ in am.  - Plan d/c home tomorrow if meeting all milestones.     Ann Parekh MD  Obstetrics  [facility]

## 2019-12-19 NOTE — NURSING
0734am=  Notified Dr. Parekh, via phone, of pt reporting abdominal cramping, 5 out of 10 pain.  Informed MD fundus firm,  Pt stating pain med no longer effective, and was medicated with Vicodin 5 mg at 0542 am with scheduled Motrin.  MD ordered to give Tylenol 1 gram PO x 1 dose now.

## 2019-12-19 NOTE — ANESTHESIA POSTPROCEDURE EVALUATION
Anesthesia Post Evaluation    Patient: Vani Hernandez    Procedure(s) Performed: * No procedures listed *    Final Anesthesia Type: CSE    Patient location during evaluation: labor & delivery  Patient participation: Yes- Able to Participate  Level of consciousness: awake and alert and oriented  Post-procedure vital signs: reviewed and stable  Pain management: adequate  Airway patency: patent    PONV status at discharge: No PONV  Anesthetic complications: no      Cardiovascular status: blood pressure returned to baseline  Respiratory status: unassisted  Hydration status: euvolemic  Follow-up not needed.      Post Anesthesia Evaluation  Anesthesia Type: CSE  Patient Location: OB floor  Post Pain: Adequate analgesia  Post Assessment: no apparent anesthetic complications and tolerated procedure well  Post Vital Signs: stable  Was patient able to participate in evaluation? Yes  Level of Consciousness: awake, alert  and oriented  Nausea/Vomiting: no nausea/no vomiting  Complications: none  Airway Patency: patent  Respiratory: unassisted  Cardiovascular: stable  Hydration: euvolemic  Follow-up Needed: No    No catheter in back  No headache/neckache/backache  Full return of neurological function  Able to urinate  Advised patient to report any new problems of back pain, especially with fever or decreasing bladder function occurring during coming days to weeks    Awake, alert & oriented  yes  Vital signs stable  yes  Pain controlled  yes  No nausea/vomiting  yes  Adequate hydration  yes        Vitals Value Taken Time   /82 12/19/2019  8:00 AM   Temp 36.4 °C (97.6 °F) 12/19/2019  8:00 AM   Pulse 66 12/19/2019  8:00 AM   Resp 18 12/19/2019  8:00 AM   SpO2 100 % 12/19/2019  8:00 AM         No case tracking events are documented in the log.      Pain/Ty Score: Pain Rating Prior to Med Admin: 5 (12/19/2019  8:03 AM)  Pain Rating Post Med Admin: 0 (12/19/2019  2:20 AM)

## 2019-12-20 VITALS
DIASTOLIC BLOOD PRESSURE: 78 MMHG | TEMPERATURE: 98 F | RESPIRATION RATE: 18 BRPM | OXYGEN SATURATION: 100 % | SYSTOLIC BLOOD PRESSURE: 116 MMHG | HEART RATE: 90 BPM

## 2019-12-20 PROCEDURE — 25000003 PHARM REV CODE 250: Performed by: OBSTETRICS & GYNECOLOGY

## 2019-12-20 PROCEDURE — 99238 HOSP IP/OBS DSCHRG MGMT 30/<: CPT | Mod: ,,, | Performed by: OBSTETRICS & GYNECOLOGY

## 2019-12-20 PROCEDURE — 99238 PR HOSPITAL DISCHARGE DAY,<30 MIN: ICD-10-PCS | Mod: ,,, | Performed by: OBSTETRICS & GYNECOLOGY

## 2019-12-20 RX ORDER — IBUPROFEN 800 MG/1
800 TABLET ORAL EVERY 8 HOURS PRN
Qty: 60 TABLET | Refills: 3 | Status: SHIPPED | OUTPATIENT
Start: 2019-12-20 | End: 2021-09-01

## 2019-12-20 RX ADMIN — PRENATAL VIT W/ FE FUMARATE-FA TAB 27-0.8 MG 1 TABLET: 27-0.8 TAB at 09:12

## 2019-12-20 RX ADMIN — IBUPROFEN 600 MG: 600 TABLET, FILM COATED ORAL at 12:12

## 2019-12-20 RX ADMIN — IBUPROFEN 600 MG: 600 TABLET, FILM COATED ORAL at 05:12

## 2019-12-20 RX ADMIN — HYDROCODONE BITARTRATE AND ACETAMINOPHEN 1 TABLET: 5; 325 TABLET ORAL at 12:12

## 2019-12-20 RX ADMIN — DOCUSATE SODIUM 200 MG: 100 CAPSULE, LIQUID FILLED ORAL at 09:12

## 2019-12-20 RX ADMIN — ACETAMINOPHEN 650 MG: 325 TABLET ORAL at 09:12

## 2019-12-20 NOTE — PLAN OF CARE
Patient ambulating freely in room.  Lochia rubra &  Light.  Fundus firm 1cm below umbilicus. Use of dermoplast and scarlett bottle reviewed with patient; reports relief of perineal pain with use. Tolerating regular diet.  Voiding and passing flatus. Questions answered/encouraged; reassurance provided.  Pt states pain goal met with prescribed medications per MD.  Bonding with baby AEB holding, feeding, dressing, and changing baby's diaper. Smiles appropriately at baby. Family support noted at bedside. POC reviewed with pt; able to verbalize acceptance and understanding. VS stable. Call light in reach, side rails up x2, nonskid socks on, bed in lowest position with wheels locked.  Remains free from falls and/or injury. NAD noted.  Will continue to monitor.

## 2019-12-20 NOTE — DISCHARGE SUMMARY
[facility]  Obstetrics  Discharge Summary      Patient Name: Vani Hernandez  MRN: [id number]  Admission Date: 2019  Hospital Length of Stay: 2 days  Discharge Date and Time:  2019 12:00pm  Attending Physician: Ann Parekh MD   Discharging Provider: Ann Parekh MD   Primary Care Provider: Guevara Rodriguez MD      Hospital Course:   35yo  presented to L&D at 39w5d in active labor.   She had a spontaneous vaginal delivery of a male infant on 19, for details please see delivery report.  Her postpartum course was complicated by occasional elevated blood pressures, but never required medication.  By PPD2 she was meeting all milestones for discharge.  She was ambulating and voiding without difficulty, tolerating a regular diet, and pain controlled with medication.  She is in stable condition for discharge home with strict precautions.  She will follow up in 1 week for BP check.          Final Active Diagnoses:    Diagnosis Date Noted POA    PRINCIPAL PROBLEM:   (normal spontaneous vaginal delivery) [O80] 2019 Not Applicable      Problems Resolved During this Admission:    Diagnosis Date Noted Date Resolved POA    39 weeks gestation of pregnancy [Z3A.39] 2019 Not Applicable    39 weeks gestation of pregnancy [Z3A.39] 2019 Not Applicable        Labs:   CBC   Recent Labs   Lab 19  0804   WBC 13.30*   HGB 11.5*   HCT 35.8*          Feeding Method: both breast and bottle    Immunizations     Date Immunization Status Dose Route/Site Given by    19 0530 Influenza - Quadrivalent - PF (6 months and older) Given 0.5 mL Intramuscular/Left deltoid Juliet Onofre RN    19 0244 MMR Deferred 0.5 mL Subcutaneous/Left stephieoid Juliet Onofre RN    19 Tdap Deleted 0.5 mL Intramuscular/Left deltoid     19 Tdap Given 0.5 mL Intramuscular/Left deltoid Juliet Onofre RN          Delivery:     Episiotomy: None   Lacerations: None   Repair suture:     Repair # of packets:     Blood loss (ml):       Birth information:  YOB: 2019   Time of birth: 12:28 PM   Sex: male   Delivery type: Vaginal, Spontaneous   Gestational Age: 39w5d    Delivery Clinician:      Other providers:       Additional  information:  Forceps:    Vacuum:    Breech:    Observed anomalies      Living?:           APGARS  One minute Five minutes Ten minutes   Skin color:         Heart rate:         Grimace:         Muscle tone:         Breathing:         Totals: 9  9        Placenta: Delivered:       appearance    Pending Diagnostic Studies:     None          Discharged Condition: stable    Disposition: Home or Self Care    Follow Up:  Follow-up Information     Ann Parekh MD. Schedule an appointment as soon as possible for a visit in 2 weeks.    Specialty:  Obstetrics and Gynecology  Why:  Postpartum follow-up  Contact information:  [address] [place] [id number]  [phone]                 Patient Instructions:      Diet Adult Regular     Other restrictions (specify):   Order Comments: Pelvic rest x 6 weeks. No heavy lifting. No baths (showers only).     Notify your health care provider if you experience any of the following:  temperature >100.4     Notify your health care provider if you experience any of the following:  persistent nausea and vomiting or diarrhea     Notify your health care provider if you experience any of the following:  severe uncontrolled pain     Notify your health care provider if you experience any of the following:  difficulty breathing or increased cough     Notify your health care provider if you experience any of the following:  severe persistent headache     Notify your health care provider if you experience any of the following:  persistent dizziness, light-headedness, or visual disturbances     Notify your health care provider if you experience any of the following:   increased confusion or weakness     Medications:  Current Discharge Medication List      START taking these medications    Details   ibuprofen (ADVIL,MOTRIN) 800 MG tablet Take 1 tablet (800 mg total) by mouth every 8 (eight) hours as needed for Pain.  Qty: 60 tablet, Refills: 3         CONTINUE these medications which have NOT CHANGED    Details   pantoprazole (PROTONIX) 40 MG tablet Take 1 tablet (40 mg total) by mouth once daily.  Qty: 30 tablet, Refills: 1    Associated Diagnoses: Heartburn during pregnancy in second trimester      PRENATAL 19 29 mg iron- 1 mg Chew Refills: 11      prenatal vit no.112-folate no6 1 mg Chew Take 1 tablet by mouth once daily.  Qty: 30 tablet, Refills: 11      UNKNOWN TO PATIENT              Ann Parekh MD  Obstetrics  [facility]

## 2019-12-20 NOTE — PLAN OF CARE
Plan of care reviewed with pt this am.   VSS. Pt voiding and passing flatus without difficulty.  Ambulating well.  Tolerating regular diet.  Reports pain relieved with ordered pain meds administered.   Questions encouraged and answered.  Bonding appropriately with infant.  Encouraged to feed infant 8 or more times in 24 hour period and on demand feedings.  Verbalized understanding.  Paced bottle feedings encouraged; on cue feedings promoted.   Mother will continue to observe for on cue feedings for infant.

## 2020-01-15 DIAGNOSIS — I83.93 VARICOSE VEINS OF BOTH LOWER EXTREMITIES, UNSPECIFIED WHETHER COMPLICATED: Primary | ICD-10-CM

## 2020-02-04 ENCOUNTER — TELEPHONE (OUTPATIENT)
Dept: VASCULAR SURGERY | Facility: CLINIC | Age: 35
End: 2020-02-04

## 2020-02-04 DIAGNOSIS — I87.2 VENOUS INSUFFICIENCY OF BOTH LOWER EXTREMITIES: Primary | ICD-10-CM

## 2020-02-04 NOTE — TELEPHONE ENCOUNTER
Attempted to contact pt to reschedule her for u/s before her consult visit with Dr. Scherer on 2/19. Voicemail left.

## 2020-02-05 ENCOUNTER — TELEPHONE (OUTPATIENT)
Dept: VASCULAR SURGERY | Facility: CLINIC | Age: 35
End: 2020-02-05

## 2020-02-05 NOTE — TELEPHONE ENCOUNTER
2nd attempt to reach pt for scheduling of u/s, which needs to be done before consult appt on 2/19 with Dr. Scherer. Voicemail left.

## 2020-02-12 ENCOUNTER — TELEPHONE (OUTPATIENT)
Dept: VASCULAR SURGERY | Facility: CLINIC | Age: 35
End: 2020-02-12

## 2020-02-12 NOTE — TELEPHONE ENCOUNTER
3rd attempt to contact pt for the rescheduling of her u/s before her consult visit with Dr. Scherer. Identified that pt recently delivered a baby also. Due to no answers of callbacks, pt's appt on 2/19 will be cancelled per Kianna Murphy RN.

## 2020-09-10 ENCOUNTER — OFFICE VISIT (OUTPATIENT)
Dept: URGENT CARE | Facility: CLINIC | Age: 35
End: 2020-09-10
Payer: MEDICAID

## 2020-09-10 VITALS
OXYGEN SATURATION: 98 % | HEIGHT: 65 IN | WEIGHT: 152 LBS | SYSTOLIC BLOOD PRESSURE: 116 MMHG | BODY MASS INDEX: 25.33 KG/M2 | HEART RATE: 75 BPM | RESPIRATION RATE: 16 BRPM | TEMPERATURE: 97 F | DIASTOLIC BLOOD PRESSURE: 81 MMHG

## 2020-09-10 DIAGNOSIS — Z20.2 POSSIBLE EXPOSURE TO STD: Primary | ICD-10-CM

## 2020-09-10 DIAGNOSIS — R21 RASH: ICD-10-CM

## 2020-09-10 DIAGNOSIS — N89.8 VAGINAL DISCHARGE: ICD-10-CM

## 2020-09-10 LAB
B-HCG UR QL: NEGATIVE
BILIRUB UR QL STRIP: NEGATIVE
CTP QC/QA: YES
GLUCOSE UR QL STRIP: NEGATIVE
KETONES UR QL STRIP: NEGATIVE
LEUKOCYTE ESTERASE UR QL STRIP: NEGATIVE
PH, POC UA: 6 (ref 5–8)
POC BLOOD, URINE: NEGATIVE
POC NITRATES, URINE: NEGATIVE
PROT UR QL STRIP: NEGATIVE
SP GR UR STRIP: 1.02 (ref 1–1.03)
UROBILINOGEN UR STRIP-ACNC: NORMAL (ref 0.1–1.1)

## 2020-09-10 PROCEDURE — 87491 CHLMYD TRACH DNA AMP PROBE: CPT

## 2020-09-10 PROCEDURE — 81003 URINALYSIS AUTO W/O SCOPE: CPT | Mod: QW,S$GLB,, | Performed by: PHYSICIAN ASSISTANT

## 2020-09-10 PROCEDURE — 99203 PR OFFICE/OUTPT VISIT, NEW, LEVL III, 30-44 MIN: ICD-10-PCS | Mod: 25,S$GLB,, | Performed by: PHYSICIAN ASSISTANT

## 2020-09-10 PROCEDURE — 81003 POCT URINALYSIS, DIPSTICK, AUTOMATED, W/O SCOPE: ICD-10-PCS | Mod: QW,S$GLB,, | Performed by: PHYSICIAN ASSISTANT

## 2020-09-10 PROCEDURE — 99203 OFFICE O/P NEW LOW 30 MIN: CPT | Mod: 25,S$GLB,, | Performed by: PHYSICIAN ASSISTANT

## 2020-09-10 RX ORDER — CEFTRIAXONE 250 MG/1
250 INJECTION, POWDER, FOR SOLUTION INTRAMUSCULAR; INTRAVENOUS
Status: COMPLETED | OUTPATIENT
Start: 2020-09-10 | End: 2020-09-10

## 2020-09-10 RX ORDER — AZITHROMYCIN 500 MG/1
1000 TABLET, FILM COATED ORAL ONCE
Qty: 2 TABLET | Refills: 0 | Status: SHIPPED | OUTPATIENT
Start: 2020-09-10 | End: 2020-09-10

## 2020-09-10 RX ORDER — TRIAMCINOLONE ACETONIDE 1 MG/G
OINTMENT TOPICAL 2 TIMES DAILY
Qty: 30 G | Refills: 1 | Status: SHIPPED | OUTPATIENT
Start: 2020-09-10 | End: 2021-09-01

## 2020-09-10 RX ORDER — METRONIDAZOLE 500 MG/1
500 TABLET ORAL EVERY 12 HOURS
Qty: 14 TABLET | Refills: 0 | Status: SHIPPED | OUTPATIENT
Start: 2020-09-10 | End: 2020-09-17

## 2020-09-10 RX ADMIN — CEFTRIAXONE 250 MG: 250 INJECTION, POWDER, FOR SOLUTION INTRAMUSCULAR; INTRAVENOUS at 09:09

## 2020-09-10 NOTE — PATIENT INSTRUCTIONS
-Take antibiotic as prescribed. Do not drink alcohol while taking this medication.  -We will call with results.  -Apply steroid cream to rash twice daily.    Please follow up with your primary care provider within 2-5 days if your signs and symptoms have not resolved or worsen.     If your condition worsens or fails to improve we recommend that you receive another evaluation at the emergency room immediately or contact your primary medical clinic to discuss your concerns.   You must understand that you have received an Urgent Care treatment only and that you may be released before all of your medical problems are known or treated. You, the patient, will arrange for follow up care as instructed.         Bacterial Vaginosis    You have a vaginal infection called bacterial vaginosis (BV). Both good and bad bacteria are present in a healthy vagina. BV occurs when these bacteria get out of balance. The number of bad bacteria increase. And the number of good bacteria decrease.  BV may or may not cause symptoms. If symptoms do occur, they can include:  · Thin, gray, milky-white, or sometimes green discharge  · Unpleasant odor or fishy smell  · Itching, burning, or pain in or around the vagina  It is not known what causes BV, but certain factors can make the problem more likely. This can include:  · Douching  · Having sex with a new partner  · Having sex with more than one partner  BV will sometimes go away on its own. But treatment is usually recommended. This is because untreated BV can increase the risk of more serious health problems such as:  · Pelvic inflammatory disease (PID)  ·  delivery (giving birth to a baby early if youre pregnant)  · HIV and certain other sexually transmitted diseases (STDs)  · Infection after surgery on the reproductive organs  Home care  General care  · BV is most often treated with medicines called antibiotics. These may be given as pills or as a vaginal cream. If antibiotics are  prescribed, be sure to use them exactly as directed. Also, be sure to complete all of the medicine, even if your symptoms go away.  · Avoid douching or having sex during treatment.  · If you have sex with a female partner, ask your healthcare provider if she should also be treated.  Prevention  · Limit or avoid douching.  · Avoid having sex. If you do have sex, then take steps to lower your risk:  ¨ Use condoms when having sex.  ¨ Limit the number of partners you have sex with.  Follow-up care  Follow up with your healthcare provider, or as advised.  When to seek medical advice  Call your healthcare provider right away if:  · You have a fever of 100.4ºF (38ºC) or higher, or as directed by your provider.  · Your symptoms worsen, or they dont go away within a few days of starting treatment.  · You have new pain in the lower belly or pelvic region.  · You have side effects that bother you or a reaction to the pills or cream youre prescribed.  · You or any partners you have sex with have new symptoms, such as a rash, joint pain, or sores.  Date Last Reviewed: 7/30/2015  © 1449-1163 [employer]. [address] [place] [address]. All rights reserved. This information is not intended as a substitute for professional medical care. Always follow your healthcare professional's instructions.        Self-Care for Skin Rashes     Pat your skin dry. Do not rub.     When your skin reacts to a substance your body is sensitive to, it can cause a rash. You can treat most rashes at home by keeping the skin clean and dry. Many rashes may get better on their own within 2 to 3 days. You may need medical attention if your rash itches, drains, or hurts, particularly if the rash is getting worse.  What can cause a skin rash?  · Sun poisoning, caused by too much exposure to the sun  · An irritant or allergic reaction to a certain type of food, plant, or chemical, such as  shellfish, poison ivy, and or cleaning  products  · An infection caused by a fungus (ringworm), virus (chickenpox), or bacteria (strep)  · Bites or infestation caused by insects or pests, such as ticks, lice, or mites  · Dry skin, which is often seen during the winter months and in older people  How can I control itching and skin damage?  · Take soothing lukewarm baths in a colloidal oatmeal product. You can buy this at the [employer].  · Do your best not to scratch. Clip fingernails short, especially in young children, to reduce skin damage if scratching does occur.  · Use moisturizing skin lotion instead of scratching your dry skin.  · Use sunscreen whenever going out into direct sun.  · Use only mild cleansing agents whenever possible.  · Wash with mild, nonirritating soap and warm water.  · Wear clothing that breathes, such as cotton shirts or canvas shoes.  · If fluid is seeping from the rash, cover it loosely with clean gauze to absorb the discharge.  · Many rashes are contagious. Prevent the rash from spreading to others by washing your hands often before or after touching others with any skin rash.  Use medicine  · Antihistamines such as diphenhydramine can help control itching. But use with caution because they can make you drowsy.  · Using over-the-counter hydrocortisone cream on small rashes may help reduce swelling and itching  · Most over-the-counter antifungal medicines can treat athletes foot and many other fungal infections of the skin.  Check with your healthcare provider  Call your healthcare provider if:  · You were told that you have a fungal infection on your skin to make sure you have the correct type of medicine.  · You have questions or concerns about medicines or their side effects.     Call 911  Call 911 if either of these occur:  · Your tongue or lips start to swell  · You have difficulty breathing      Call your healthcare provider  Call your healthcare provider if any of these occur:  · Temperature of more than 101.0°F  (38.3°C), or as directed  · Sore throat, a cough, or unusual fatigue  · Red, oozy, or painful rash gets worse. These are signs of infection.  · Rash covers your face, genitals, or most of your body  · Crusty sores or red rings that begin to spread  · You were exposed to someone who has a contagious rash, such as scabies or lice.  · Red bulls-eye rash with a white center (a sign of Lyme disease)  · You were told that you have resistant bacteria (MRSA) on your skin.   Date Last Reviewed: 5/12/2015  © 2294-1487 [employer]. [address] [place] [address], [place] [address]. All rights reserved. This information is not intended as a substitute for professional medical care. Always follow your healthcare professional's instructions.

## 2020-09-10 NOTE — PROGRESS NOTES
"Subjective:       Patient ID: Vani Hernandez is a 35 y.o. female.    Vitals:  height is 5' 4.8" (1.646 m) and weight is 68.9 kg (152 lb). Her temporal temperature is 97.2 °F (36.2 °C). Her blood pressure is 116/81 and her pulse is 75. Her respiration is 16 and oxygen saturation is 98%.     Chief Complaint: Rash (shoulder) and Vaginitis    Patient has had white, thick vaginal discharge for a week that is malodorous. No itching or burning just irritation. Pt states she has been sexually active with a previous partner, so it is possible she has an STD. She reports a history of bacterial vaginosis.    Rash  This is a new problem. The current episode started in the past 7 days. The problem has been gradually worsening since onset. Location: neck down to chest area. The rash is characterized by redness and itchiness. 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 Pertinent negatives include no anorexia, congestion, cough, diarrhea, facial edema, fever, joint pain, rhinorrhea, shortness of breath, sore throat or vomiting. Past treatments include nothing.       Constitution: Negative for fever.   HENT: Negative for congestion and sore throat.    Neck: Negative for painful lymph nodes.   Respiratory: Negative for cough and shortness of breath.    Gastrointestinal: Negative for abdominal pain, vomiting and diarrhea.   Genitourinary: Positive for vaginal pain, vaginal discharge, vaginal odor and pelvic pain (pressure). Negative for dysuria, urine decreased, history of kidney stones, painful menstruation, irregular menstruation, missed menses, heavy menstrual bleeding, ovarian cysts, genital trauma, vaginal bleeding, painful intercourse, genital sore and painful ejaculation.   Musculoskeletal: Negative for back pain.   Skin: Negative for lesion.   Hematologic/Lymphatic: Negative for swollen lymph nodes.       Objective:      Physical Exam   Constitutional: She is oriented to person, place, and time. She " appears well-developed. She is cooperative.  Non-toxic appearance. She does not appear ill. No distress.   HENT:   Head: Normocephalic and atraumatic.   Ears:   Right Ear: External ear normal.   Left Ear: External ear normal.   Nose: Nose normal.   Mouth/Throat: Oropharynx is clear and moist.   Eyes: Conjunctivae, EOM and lids are normal. Right eye exhibits no discharge. Left eye exhibits no discharge. No scleral icterus.   Neck: Trachea normal, normal range of motion, full passive range of motion without pain and phonation normal. Neck supple.   Cardiovascular: Normal rate, regular rhythm and normal heart sounds. Exam reveals no gallop.   No murmur heard.  Pulmonary/Chest: Effort normal and breath sounds normal. No respiratory distress. She has no decreased breath sounds. She has no wheezes. She has no rhonchi. She has no rales.   Musculoskeletal:         General: No deformity.   Neurological: She is alert and oriented to person, place, and time. Coordination normal.   Skin: Skin is warm, dry, intact, not diaphoretic, not pale and rash.      Psychiatric: Her speech is normal and behavior is normal. Judgment and thought content normal.   Nursing note and vitals reviewed.        Assessment:       1. Possible exposure to STD    2. Vaginal discharge    3. Rash        Office Visit on 09/10/2020   Component Date Value Ref Range Status    POC Preg Test, Ur 09/10/2020 Negative  Negative Final     Acceptable 09/10/2020 Yes   Final    POC Blood, Urine 09/10/2020 Negative  Negative Final    POC Bilirubin, Urine 09/10/2020 Negative  Negative Final    POC Urobilinogen, Urine 09/10/2020 Normal  0.1 - 1.1 Final    POC Ketones, Urine 09/10/2020 Negative  Negative Final    POC Protein, Urine 09/10/2020 Negative  Negative Final    POC Nitrates, Urine 09/10/2020 Negative  Negative Final    POC Glucose, Urine 09/10/2020 Negative  Negative Final    pH, UA 09/10/2020 6.0  5 - 8 Final    POC Specific Gravity,  Urine 09/10/2020 1.025  1.003 - 1.029 Final    POC Leukocytes, Urine 09/10/2020 Negative  Negative Final     Plan:       Patient kindly refused  exam. Offered to test for bv - she states she would rather just be treated. 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 Advised she try applying a cool compress to rash when she is overheated. Sending in steroid cream to help with inflammation and itchiness.    Possible exposure to STD  -     POCT urine pregnancy  -     cefTRIAXone injection 250 mg  -     azithromycin (ZITHROMAX) 500 MG tablet; Take 2 tablets (1,000 mg total) by mouth once. for 1 dose  Dispense: 2 tablet; Refill: 0  -     C. trachomatis/N. gonorrhoeae by AMP DNA    Vaginal discharge  -     POCT Urinalysis, Dipstick, Automated, W/O Scope  -     metroNIDAZOLE (FLAGYL) 500 MG tablet; Take 1 tablet (500 mg total) by mouth every 12 (twelve) hours. for 7 days  Dispense: 14 tablet; Refill: 0    Rash  -     triamcinolone acetonide 0.1% (KENALOG) 0.1 % ointment; Apply topically 2 (two) times daily. for 5 days  Dispense: 30 g; Refill: 1      Patient Instructions     -Take antibiotic as prescribed. Do not drink alcohol while taking this medication.  -We will call with results.  -Apply steroid cream to rash twice daily.    Please follow up with your primary care provider within 2-5 days if your signs and symptoms have not resolved or worsen.     If your condition worsens or fails to improve we recommend that you receive another evaluation at the emergency room immediately or contact your primary medical clinic to discuss your concerns.   You must understand that you have received an Urgent Care treatment only and that you may be released before all of your medical problems are known or treated. You, the patient, will arrange for follow up care as instructed.         Bacterial Vaginosis    You have a vaginal infection called bacterial vaginosis (BV). Both good and bad bacteria are present in a healthy vagina.  BV occurs when these bacteria get out of balance. The number of bad bacteria increase. And the number of good bacteria decrease.  BV may or may not cause symptoms. If symptoms do occur, they can include:  · Thin, gray, milky-white, or sometimes green discharge  · Unpleasant odor or fishy smell  · Itching, burning, or pain in or around the vagina  It is not known what causes BV, but certain factors can make the problem more likely. This can include:  · Douching  · Having sex with a new partner  · Having sex with more than one partner  BV will sometimes go away on its own. But treatment is usually recommended. This is because untreated BV can increase the risk of more serious health problems such as:  · Pelvic inflammatory disease (PID)  ·  delivery (giving birth to a baby early if youre pregnant)  · HIV and certain other sexually transmitted diseases (STDs)  · Infection after surgery on the reproductive organs  Home care  General care  · BV is most often treated with medicines called antibiotics. These may be given as pills or as a vaginal cream. If antibiotics are prescribed, be sure to use them exactly as directed. Also, be sure to complete all of the medicine, even if your symptoms go away.  · Avoid douching or having sex during treatment.  · If you have sex with a female partner, ask your healthcare provider if she should also be treated.  Prevention  · Limit or avoid douching.  · Avoid having sex. If you do have sex, then take steps to lower your risk:  ¨ Use condoms when having sex.  ¨ Limit the number of partners you have sex with.  Follow-up care  Follow up with your healthcare provider, or as advised.  When to seek medical advice  Call your healthcare provider right away if:  · You have a fever of 100.4ºF (38ºC) or higher, or as directed by your provider.  · Your symptoms worsen, or they dont go away within a few days of starting treatment.  · You have new pain in the lower belly or pelvic  region.  · You have side effects that bother you or a reaction to the pills or cream youre prescribed.  · You or any partners you have sex with have new symptoms, such as a rash, joint pain, or sores.  Date Last Reviewed: 7/30/2015  © 4514-0168 [employer]. [address] [place] [address], [place] [address]. All rights reserved. This information is not intended as a substitute for professional medical care. Always follow your healthcare professional's instructions.        Self-Care for Skin Rashes     Pat your skin dry. Do not rub.     When your skin reacts to a substance your body is sensitive to, it can cause a rash. You can treat most rashes at home by keeping the skin clean and dry. Many rashes may get better on their own within 2 to 3 days. You may need medical attention if your rash itches, drains, or hurts, particularly if the rash is getting worse.  What can cause a skin rash?  · Sun poisoning, caused by too much exposure to the sun  · An irritant or allergic reaction to a certain type of food, plant, or chemical, such as  shellfish, poison ivy, and or cleaning products  · An infection caused by a fungus (ringworm), virus (chickenpox), or bacteria (strep)  · Bites or infestation caused by insects or pests, such as ticks, lice, or mites  · Dry skin, which is often seen during the winter months and in older people  How can I control itching and skin damage?  · Take soothing lukewarm baths in a colloidal oatmeal product. You can buy this at the drugstore.  · Do your best not to scratch. Clip fingernails short, especially in young children, to reduce skin damage if scratching does occur.  · Use moisturizing skin lotion instead of scratching your dry skin.  · Use sunscreen whenever going out into direct sun.  · Use only mild cleansing agents whenever possible.  · Wash with mild, nonirritating soap and warm water.  · Wear clothing that breathes, such as cotton shirts or canvas shoes.  · If fluid is seeping  from the rash, cover it loosely with clean gauze to absorb the discharge.  · Many rashes are contagious. Prevent the rash from spreading to others by washing your hands often before or after touching others with any skin rash.  Use medicine  · Antihistamines such as diphenhydramine can help control itching. But use with caution because they can make you drowsy.  · Using over-the-counter hydrocortisone cream on small rashes may help reduce swelling and itching  · Most over-the-counter antifungal medicines can treat athletes foot and many other fungal infections of the skin.  Check with your healthcare provider  Call your healthcare provider if:  · You were told that you have a fungal infection on your skin to make sure you have the correct type of medicine.  · You have questions or concerns about medicines or their side effects.     Call 911  Call 911 if either of these occur:  · Your tongue or lips start to swell  · You have difficulty breathing      Call your healthcare provider  Call your healthcare provider if any of these occur:  · Temperature of more than 101.0°F (38.3°C), or as directed  · Sore throat, a cough, or unusual fatigue  · Red, oozy, or painful rash gets worse. These are signs of infection.  · Rash covers your face, genitals, or most of your body  · Crusty sores or red rings that begin to spread  · You were exposed to someone who has a contagious rash, such as scabies or lice.  · Red bulls-eye rash with a white center (a sign of Lyme disease)  · You were told that you have resistant bacteria (MRSA) on your skin.   Date Last Reviewed: 5/12/2015 © 2000-2017 [employer]. [address] [place] [address], [place] [address]. All rights reserved. This information is not intended as a substitute for professional medical care. Always follow your healthcare professional's instructions.

## 2020-09-27 ENCOUNTER — HOSPITAL ENCOUNTER (EMERGENCY)
Facility: HOSPITAL | Age: 35
Discharge: LEFT BEFORE DISCHARGE | End: 2020-09-27
Payer: MEDICAID

## 2020-09-27 VITALS
HEART RATE: 73 BPM | DIASTOLIC BLOOD PRESSURE: 91 MMHG | HEIGHT: 64 IN | WEIGHT: 138 LBS | BODY MASS INDEX: 23.56 KG/M2 | OXYGEN SATURATION: 96 % | SYSTOLIC BLOOD PRESSURE: 145 MMHG | RESPIRATION RATE: 20 BRPM | TEMPERATURE: 98 F

## 2020-09-27 DIAGNOSIS — M25.519 SHOULDER PAIN: ICD-10-CM

## 2020-09-27 LAB
B-HCG UR QL: NEGATIVE
CTP QC/QA: YES

## 2020-09-27 PROCEDURE — 81025 URINE PREGNANCY TEST: CPT | Performed by: PHYSICIAN ASSISTANT

## 2020-09-27 PROCEDURE — 99900041 HC LEFT WITHOUT BEING SEEN- EMERGENCY

## 2020-10-07 LAB
C TRACH DNA SPEC QL NAA+PROBE: NOT DETECTED
N GONORRHOEA DNA SPEC QL NAA+PROBE: NOT DETECTED

## 2021-09-01 ENCOUNTER — OFFICE VISIT (OUTPATIENT)
Dept: FAMILY MEDICINE | Facility: CLINIC | Age: 36
End: 2021-09-01
Payer: MEDICAID

## 2021-09-01 VITALS
SYSTOLIC BLOOD PRESSURE: 145 MMHG | TEMPERATURE: 98 F | HEART RATE: 82 BPM | RESPIRATION RATE: 18 BRPM | OXYGEN SATURATION: 98 % | DIASTOLIC BLOOD PRESSURE: 87 MMHG

## 2021-09-01 DIAGNOSIS — J45.30 MILD PERSISTENT ASTHMA WITHOUT COMPLICATION: ICD-10-CM

## 2021-09-01 DIAGNOSIS — R10.2 ACUTE SUPRAPUBIC PAIN: Primary | ICD-10-CM

## 2021-09-01 PROBLEM — J45.31 MILD PERSISTENT ASTHMA WITH ACUTE EXACERBATION: Status: ACTIVE | Noted: 2021-09-01

## 2021-09-01 PROBLEM — J45.31 MILD PERSISTENT ASTHMA WITH ACUTE EXACERBATION: Status: RESOLVED | Noted: 2021-09-01 | Resolved: 2021-09-01

## 2021-09-01 PROCEDURE — 99213 OFFICE O/P EST LOW 20 MIN: CPT | Mod: S$PBB,,, | Performed by: FAMILY MEDICINE

## 2021-09-01 PROCEDURE — 99213 OFFICE O/P EST LOW 20 MIN: CPT | Mod: PBBFAC,PO | Performed by: FAMILY MEDICINE

## 2021-09-01 PROCEDURE — 99999 PR PBB SHADOW E&M-EST. PATIENT-LVL III: CPT | Mod: PBBFAC,,, | Performed by: FAMILY MEDICINE

## 2021-09-01 PROCEDURE — 99999 PR PBB SHADOW E&M-EST. PATIENT-LVL III: ICD-10-PCS | Mod: PBBFAC,,, | Performed by: FAMILY MEDICINE

## 2021-09-01 PROCEDURE — 99213 PR OFFICE/OUTPT VISIT, EST, LEVL III, 20-29 MIN: ICD-10-PCS | Mod: S$PBB,,, | Performed by: FAMILY MEDICINE

## 2021-09-01 RX ORDER — NITROFURANTOIN 25; 75 MG/1; MG/1
100 CAPSULE ORAL 2 TIMES DAILY
Qty: 10 CAPSULE | Refills: 0 | Status: SHIPPED | OUTPATIENT
Start: 2021-09-01 | End: 2021-09-06

## 2021-09-01 RX ORDER — ALBUTEROL SULFATE 90 UG/1
2 AEROSOL, METERED RESPIRATORY (INHALATION) EVERY 6 HOURS PRN
Qty: 18 G | Refills: 0 | Status: SHIPPED | OUTPATIENT
Start: 2021-09-01 | End: 2022-09-01

## 2021-09-18 ENCOUNTER — OFFICE VISIT (OUTPATIENT)
Dept: URGENT CARE | Facility: CLINIC | Age: 36
End: 2021-09-18
Payer: MEDICAID

## 2021-09-18 VITALS
HEIGHT: 64 IN | SYSTOLIC BLOOD PRESSURE: 127 MMHG | WEIGHT: 138 LBS | OXYGEN SATURATION: 99 % | TEMPERATURE: 98 F | HEART RATE: 64 BPM | RESPIRATION RATE: 16 BRPM | BODY MASS INDEX: 23.56 KG/M2 | DIASTOLIC BLOOD PRESSURE: 85 MMHG

## 2021-09-18 DIAGNOSIS — N89.8 VAGINAL DISCHARGE: Primary | ICD-10-CM

## 2021-09-18 LAB
B-HCG UR QL: NEGATIVE
CTP QC/QA: YES

## 2021-09-18 PROCEDURE — 87481 CANDIDA DNA AMP PROBE: CPT | Mod: 59 | Performed by: NURSE PRACTITIONER

## 2021-09-18 PROCEDURE — 99213 PR OFFICE/OUTPT VISIT, EST, LEVL III, 20-29 MIN: ICD-10-PCS | Mod: S$GLB,,, | Performed by: NURSE PRACTITIONER

## 2021-09-18 PROCEDURE — 81025 POCT URINE PREGNANCY: ICD-10-PCS | Mod: S$GLB,,, | Performed by: NURSE PRACTITIONER

## 2021-09-18 PROCEDURE — 81025 URINE PREGNANCY TEST: CPT | Mod: S$GLB,,, | Performed by: NURSE PRACTITIONER

## 2021-09-18 PROCEDURE — 99213 OFFICE O/P EST LOW 20 MIN: CPT | Mod: S$GLB,,, | Performed by: NURSE PRACTITIONER

## 2021-09-18 RX ORDER — FLUCONAZOLE 150 MG/1
150 TABLET ORAL DAILY
Qty: 1 TABLET | Refills: 0 | Status: SHIPPED | OUTPATIENT
Start: 2021-09-18 | End: 2021-09-19

## 2021-09-23 LAB
BACTERIAL VAGINOSIS DNA: POSITIVE
CANDIDA GLABRATA DNA: NEGATIVE
CANDIDA KRUSEI DNA: NEGATIVE
CANDIDA RRNA VAG QL PROBE: NEGATIVE
T VAGINALIS RRNA GENITAL QL PROBE: NEGATIVE

## 2022-02-08 NOTE — ED PROVIDER NOTES
"Encounter Date: 11/15/2017       History     Chief Complaint   Patient presents with    Gastroesophageal Reflux     seen in ER and left  before treatment completed b/c of her child.     Abdominal Pain    Vomiting     32-year-old female with past medical history of asthma and GERD presents to the ED complaining of epigastric abdominal pain.  She reports that the pain has been present intermittently for a while but has never been on any PPI.  She had an umbilical hernia repair about 2 weeks ago with no improvement of her pain.  Pain is intermittent and only occurs after eating or when lying flat.  She describes it as "upset/burning" pain.  She denies any current pain or abdominal trauma.  She was seen in the ED yesterday and had a workup but had to leave before any prescriptions could be provided to care for her child.  She denies fever, chills, chest pain, diarrhea, dysuria, flank pain, headache, lightheadedness.  She denies tobacco, alcohol, or drug use.      The history is provided by the patient.     Review of patient's allergies indicates:  No Known Allergies  Past Medical History:   Diagnosis Date    Asthma     GERD (gastroesophageal reflux disease)      Past Surgical History:   Procedure Laterality Date    HERNIA REPAIR       Family History   Problem Relation Age of Onset    Diabetes Mother     No Known Problems Father      Social History   Substance Use Topics    Smoking status: Former Smoker    Smokeless tobacco: Never Used    Alcohol use No     Review of Systems   Constitutional: Negative for chills and fever.   HENT: Negative for congestion, rhinorrhea and sore throat.    Eyes: Negative for photophobia and visual disturbance.   Respiratory: Negative for shortness of breath.    Cardiovascular: Negative for chest pain.   Gastrointestinal: Positive for abdominal pain, nausea and vomiting. Negative for constipation and diarrhea.   Genitourinary: Negative for dysuria and hematuria.   Musculoskeletal: " Negative for myalgias, neck pain and neck stiffness.   Skin: Negative for rash and wound.   Neurological: Negative for dizziness, syncope, weakness, light-headedness, numbness and headaches.   Psychiatric/Behavioral: Negative for confusion.       Physical Exam     Initial Vitals [11/15/17 0831]   BP Pulse Resp Temp SpO2   132/87 68 16 98.1 °F (36.7 °C) 99 %      MAP       102         Physical Exam    Nursing note and vitals reviewed.  Constitutional: She appears well-developed and well-nourished. She is not diaphoretic. No distress.   HENT:   Head: Normocephalic and atraumatic.   Neck: Normal range of motion. Neck supple.   Cardiovascular: Normal rate, regular rhythm and normal heart sounds. Exam reveals no gallop and no friction rub.    No murmur heard.  Pulmonary/Chest: Breath sounds normal. She has no wheezes. She has no rhonchi. She has no rales.   Abdominal: Soft. Bowel sounds are normal. There is no tenderness. There is no rebound and no guarding.   Musculoskeletal: Normal range of motion.   Neurological: She is alert and oriented to person, place, and time.   Skin: Skin is warm and dry. No rash noted. No erythema.   Psychiatric: She has a normal mood and affect.         ED Course   Procedures  Labs Reviewed - No data to display          Medical Decision Making:   History:   Old Medical Records: I decided to obtain old medical records.  Old Records Summarized: records from previous admission(s).       <> Summary of Records: Seen in the ED yesterday.  No leukocytosis.  CMP unremarkable.  Lipase is normal.  UPT negative.  No UTI.       APC / Resident Notes:   32-year-old female with past medical history of asthma and GERD presents to the ED complaining of epigastric abdominal pain.  Vital signs stable.  Regular rate and rhythm.  Lungs are clear.  Abdomen is soft and nontender.  No CVA tenderness.  Patient was seen in the ED yesterday and had an unremarkable workup.  She eloped from the ED prior to GI cocktail or  Zofran being administered.  No leukocytosis.  Lipase within normal limits.  CMP unremarkable.  No UTI.  She denies any current pain and states she is not having any current nausea.  She declined GI cocktail and Zofran at this time and instead would just like prescriptions and to be discharged from the ED.  I do not feel the patient needs any further workup at this time.  Stable for discharge.    She was discharged with prescriptions for prilosec, carafate, and zofran.  She will follow up with her PCP.  All of the patient's questions were answered.  I reviewed the patient's chart and discussed the case with my supervising physician.            Attending Attestation:     Physician Attestation Statement for NP/PA:   I discussed this assessment and plan of this patient with the NP/PA, but I did not personally examine the patient. The face to face encounter was performed by the NP/PA.    Other NP/PA Attestation Additions:      Medical Decision Making: Epigastric pain       Physician Attestation for Scribe:      Comments: I, Dr. Nancy Villegas, personally performed the services described in this documentation. All medical record entries made by the scribe were at my direction and in my presence.  I have reviewed the chart and agree that the record reflects my personal performance and is accurate and complete. Nancy Villegas MD.              ED Course      Clinical Impression:   The primary encounter diagnosis was Abdominal pain, unspecified abdominal location. A diagnosis of Non-intractable vomiting with nausea, unspecified vomiting type was also pertinent to this visit.    Disposition:   Disposition: Discharged  Condition: Stable                        CLINTON SteenC  11/15/17 1826       Nancy Villegas MD  11/20/17 2025       Nancy Villegas MD  11/20/17 2025     [facility] Pregnancy And Lactation Text: This medication is considered safe during pregnancy and breastfeeding.

## 2022-12-28 ENCOUNTER — HOSPITAL ENCOUNTER (EMERGENCY)
Facility: OTHER | Age: 37
Discharge: HOME | End: 2022-12-28
Attending: EMERGENCY MEDICINE
Payer: MEDICAID

## 2022-12-28 VITALS
DIASTOLIC BLOOD PRESSURE: 78 MMHG | RESPIRATION RATE: 17 BRPM | TEMPERATURE: 99 F | HEART RATE: 63 BPM | BODY MASS INDEX: 23.39 KG/M2 | HEIGHT: 64 IN | OXYGEN SATURATION: 99 % | WEIGHT: 137 LBS | SYSTOLIC BLOOD PRESSURE: 128 MMHG

## 2022-12-28 DIAGNOSIS — Z20.822 LAB TEST NEGATIVE FOR COVID-19 VIRUS: ICD-10-CM

## 2022-12-28 DIAGNOSIS — Z11.1 NORMAL SCREENING CHEST X-RAY FOR TUBERCULOSIS: Primary | ICD-10-CM

## 2022-12-28 LAB
CTP QC/QA: YES
SARS-COV-2 RDRP RESP QL NAA+PROBE: NEGATIVE

## 2022-12-28 PROCEDURE — 87635 SARS-COV-2 COVID-19 AMP PRB: CPT | Performed by: EMERGENCY MEDICINE

## 2022-12-28 PROCEDURE — 99283 EMERGENCY DEPT VISIT LOW MDM: CPT | Mod: 25

## 2022-12-28 NOTE — ED PROVIDER NOTES
"SCRIBE #1 NOTE: I, Clay De Luna, am scribing for, and in the presence of,  Cash Lewis DO.       Source of History:  Patient.    Chief complaint:  Medical Clearance (Pt presented to ER for clearance to the [facility].)      HPI:  Vani Hernandez is a 37 y.o. female presenting with no complaints. She is in the ED seeking COVID-19 and tuberculosis screening for medical clearance for the [facility].    This is the extent to the patients complaints today here in the emergency department.    ROS: As per HPI and below:  Constitutional: No fever.  No chills.  Eyes: No visual changes.   ENT: No sore throat. No ear pain.  Urinary: No abnormal urination.  MSK: No back pain. No joint pain.   Integument: No rashes or lesions.    Review of patient's allergies indicates:  No Known Allergies    PMH:  As per HPI and below:  Past Medical History:   Diagnosis Date    Asthma     GERD (gastroesophageal reflux disease)      Past Surgical History:   Procedure Laterality Date    HERNIA REPAIR         Social History     Tobacco Use    Smoking status: Former    Smokeless tobacco: Never   Substance Use Topics    Alcohol use: Not Currently    Drug use: No       Physical Exam:    /78 (BP Location: Left arm, Patient Position: Sitting)   Pulse 63   Temp 98.5 °F (36.9 °C) (Oral)   Resp 17   Ht 5' 4" (1.626 m)   Wt 62.1 kg (137 lb)   SpO2 99%   BMI 23.52 kg/m²   Nursing note and vital signs reviewed.  Constitutional: No acute distress.  Nontoxic  Eyes: No conjunctival injection. Extraocular muscles intact.  ENT: Normal phonation.  Musculoskeletal: Good range of motion all joints.  No deformities.  Neck supple.  No meningismus. Neurovascularly intact.  Skin: No rashes seen.  Good turgor.  No abrasions.  No ecchymoses.  Psych:  Alert and oriented x 3.  Appropriate, conversant.    I decided to obtain the patient's medical records.  Summary of Medical Records:      MDM/ Differential Dx:   37 y.o. female with needing COVID " as well as chest x-ray to rule out TB for [facility] screening.  No complaints.    ED Course as of 12/28/22 1802   Wed Dec 28, 2022   1801 X-Ray Chest 1 View  Chest x-ray independently interpreted by myself shows normal cardiac size, no infiltrate or focal consolidation, no pneumothorax, no bony abnormalities.  Overall impression negative chest x-ray. []   1801 SARS-CoV-2 RNA, Amplification, Qual: Negative []   1801 No further workup is indicated in the emergency department today.  I updated pt regarding results and I counseled pt regarding supportive care measures.  Diagnosis and treatment plan explained to patient. I have answered all questions and the patient is satisfied with the plan of care. Patient discharged home in stable condition.  []      ED Course User Index  [] Cash Lewis DO              Scribe Attestation:   Scribe #1: I performed the above scribed service and the documentation accurately describes the services I performed. I attest to the accuracy of the note.  Physician Attestation for Scribe: I, Dr Cash Lewis, reviewed documentation as scribed in my presence, which is both accurate and complete.    Diagnostic Impression:    1. Normal screening chest x-ray for tuberculosis    2. Lab test negative for COVID-19 virus         ED Disposition Condition    Discharge Stable            ED Prescriptions    None       Follow-up Information       Follow up With Specialties Details Why Contact Info    [facility] [place]   As needed [address] [facility] [address]  [phone]               Cash Lewis DO  12/28/22 1802

## 2023-01-20 ENCOUNTER — HOSPITAL ENCOUNTER (EMERGENCY)
Facility: HOSPITAL | Age: 38
Discharge: HOME | End: 2023-01-20
Attending: EMERGENCY MEDICINE
Payer: MEDICAID

## 2023-01-20 VITALS
TEMPERATURE: 98 F | HEART RATE: 73 BPM | OXYGEN SATURATION: 98 % | SYSTOLIC BLOOD PRESSURE: 123 MMHG | RESPIRATION RATE: 18 BRPM | DIASTOLIC BLOOD PRESSURE: 76 MMHG

## 2023-01-20 DIAGNOSIS — Z48.02 VISIT FOR SUTURE REMOVAL: ICD-10-CM

## 2023-01-20 DIAGNOSIS — Z46.89 ENCOUNTER FOR CAST REMOVAL: Primary | ICD-10-CM

## 2023-01-20 PROCEDURE — 99281 EMR DPT VST MAYX REQ PHY/QHP: CPT

## 2023-01-20 NOTE — ED PROVIDER NOTES
Encounter Date: 1/20/2023    SCRIBE #1 NOTE: I, Galo Conti, am scribing for, and in the presence of,  Winsome Jose NP. I have scribed the following portions of the note - Other sections scribed: HPI, ANALIA.     History     Chief Complaint   Patient presents with    Cast Removal     Pt presents to ED today for cast removal  Reports placed x 2 months ago at [facility]  Pt reports difficult to communicate with ortho      Vani Hernandez is a 37 y.o. female who presents to the ED for a cast removal. The patient presents with a cast to the right wrist and states it was placed at [facility] 2 months ago due to a tendon laceration. She reports she was told she needs surgery but has been unable to make an appointment. She is requesting the cast be removed in the ED today.     The history is provided by the patient.   Review of patient's allergies indicates:  No Known Allergies  Past Medical History:   Diagnosis Date    Asthma     GERD (gastroesophageal reflux disease)      Past Surgical History:   Procedure Laterality Date    HERNIA REPAIR       Family History   Problem Relation Age of Onset    Diabetes Mother     No Known Problems Father     Breast cancer Neg Hx     Colon cancer Neg Hx     Ovarian cancer Neg Hx     Birth defects Neg Hx      Social History     Tobacco Use    Smoking status: Former    Smokeless tobacco: Never   Substance Use Topics    Alcohol use: Not Currently    Drug use: No     Review of Systems   All other systems reviewed and are negative.    Physical Exam     Initial Vitals [01/20/23 1029]   BP Pulse Resp Temp SpO2   123/76 73 18 98.2 °F (36.8 °C) 98 %      MAP       --         Physical Exam    Constitutional: She appears well-developed and well-nourished.   HENT:   Head: Normocephalic.   Right Ear: Hearing and tympanic membrane normal.   Left Ear: Hearing and tympanic membrane normal.   Nose: Nose normal.   Mouth/Throat: Oropharynx is clear and moist.   Eyes: Lids are normal. Pupils are equal, round, and  reactive to light.   Neck:   Normal range of motion.  Cardiovascular:  Normal rate.           Pulmonary/Chest: Breath sounds normal. No respiratory distress. She has no wheezes. She has no rhonchi.   Abdominal: Abdomen is soft. There is no abdominal tenderness.   Musculoskeletal:         General: Normal range of motion.      Cervical back: Normal range of motion. No rigidity.      Comments: Cast noted to the right forearm.  Patient has range of motion of each digit minus the 1st thumb that is casted to not have range of motion.      Edit-   After the cast was removed there were multiple sutures noted to the palmar aspect of the hand extending into the webspace between the 1st and 2nd digit.  The sutures were removed in which the patient tolerated the procedure well.  There was no surrounding erythema or discharge.  The sutures have been present for over 2 months now.     Neurological: She is alert and oriented to person, place, and time.   Skin: Skin is warm and dry. No rash noted.   Psychiatric: She has a normal mood and affect. Her behavior is normal. Judgment and thought content normal.       ED Course   Suture Removal    Date/Time: 1/20/2023 12:12 PM  Location procedure was performed: [facility] EMERGENCY DEPARTMENT  Performed by: Winsome Jose NP  Authorized by: Fred iMller MD   Location: right hand.  Wound Appearance: well healed, normal color and no drainage  Sutures Removed: 8  Complications: No  Specimens: No  Implants: No  Comments: Dried skin noted to the site and cleaned.       Labs Reviewed - No data to display       Imaging Results    None          Medications - No data to display  Medical Decision Making:   Initial Assessment:   Vani Hernandez is a 37 y.o. female who presents to the ED for a cast removal.        Scribe Attestation:   Scribe #1: I performed the above scribed service and the documentation accurately describes the services I performed. I attest to the accuracy of the note.      ED  "Course as of 01/20/23 1212   Fri Jan 20, 2023   1049 Online with [facility] in which I spoke to Adrianne.  She states that they have been attempting multiple times to contact the patient to have her scheduled for cast removal and a visit with the hand surgeon however the patient has been very difficult to schedule.  She is going to attempt to contact the resident who is on-call at this time for further advice as the patient does have a known tendon laceration that is set to require surgery.  I do not feel comfortable at this time removing the cast at this time. [DT]   1052 Pt overheard stating "if they dont take the cast off today, I will cut it off myself"   [DT]   1120 Spoke with Dr Mattson at [facility] who is with hand surgery group. States the cast can be removed today, she needs to follow up with them in clinic on Feb 2 for surgery consult and he will also schedule PT.  [DT]   1128 SHAHZAD Martin removing cast at this time. Pt tolerated well.  [DT]   1205 Patient also had sutures under the cast which were removed at the request of  at [facility].  The patient was given strict return precautions to [facility] to the hand surgery Department as she has an appointment scheduled for January 26th at 12:30 p.m..  She is not cleared for work at this time and will get clearance once her physical therapy is completed. [DT]      ED Course User Index  [DT] Winsome Jose NP                 Clinical Impression:   Final diagnoses:  [Z46.89] Encounter for cast removal (Primary)  [Z48.02] Visit for suture removal        ED Disposition Condition    Discharge Stable          ED Prescriptions    None       Follow-up Information       Follow up With Specialties Details Why Contact Info    Dr. Mattson    [phone]  Appmt scheduled for JAN 26TH 12:30  Please do not miss appmt        IWinsome NP, personally performed the services described in this documentation.All medical record entries made by the scribe were at my " direction and in my presence.I have reviewed the chart and agree that the record reflects my personal performance and is accurate and complete.        Winsome Jose NP  01/20/23 1211       Winsome Jose NP  01/20/23 1212

## 2023-01-20 NOTE — ED TRIAGE NOTES
T reports to the ED with a  complaint of wanting her cast on right wrist removed. Pt states she has had this on for about 2 months and have had trouble getting in contact with ortho to have it removed. Pt states she con not work with it on.       Patient identifiers verified and correct.     APPEARANCE: Patient not in acute distress.  NEURO: Awake, alert, appropriate for age, condition, and situation, pupils equal, round, and reactive.   HEENT: Head symmetrical. Eyes bilateral.  Bilateral ears without drainage. Bilateral nares patent, throat clear.  CARDIAC: Regular rate and rhythm  RESPIRATORY: Airway is open and patent. Respirations are normal and spontaneous on room air.  NEUROVASCULAR: All extremities are warm and pink. .  MUSCULOSKELETAL: Moves all extremities.   SKIN: Warm and dry, adequate turgor, mucus membranes moist and pink; no breakdown, lesions, or ecchymosis noted.     Will continue to monitor.

## 2023-01-20 NOTE — DISCHARGE INSTRUCTIONS

## 2023-01-26 ENCOUNTER — PATIENT OUTREACH (OUTPATIENT)
Dept: EMERGENCY MEDICINE | Facility: HOSPITAL | Age: 38
End: 2023-01-26
Payer: MEDICAID

## 2024-02-07 DIAGNOSIS — J45.30 MILD PERSISTENT ASTHMA WITHOUT COMPLICATION: ICD-10-CM

## 2024-02-07 NOTE — TELEPHONE ENCOUNTER
Refill Decision Note   Vani Hernandez  is requesting a refill authorization.  Brief Assessment and Rationale for Refill:  Quick Discontinue     Medication Therapy Plan: Pt no longer under providers care      Comments:     Note composed:10:08 AM 02/07/2024

## (undated) DEVICE — SUT MCRYL PLUS 4-0 PS2 27IN

## (undated) DEVICE — SUT 2-0 12-18IN SILK

## (undated) DEVICE — SEE MEDLINE ITEM 157116

## (undated) DEVICE — DRESSING TRANS 4X4 3/4

## (undated) DEVICE — SYR B-D DISP CONTROL 10CC100/C

## (undated) DEVICE — APPLICATOR CHLORAPREP ORN 26ML

## (undated) DEVICE — ELECTRODE REM PLYHSV RETURN 9

## (undated) DEVICE — GAUZE SPONGE 4X4 12PLY

## (undated) DEVICE — SEE MEDLINE ITEM 152622

## (undated) DEVICE — SEE MEDLINE ITEM 157148

## (undated) DEVICE — NDL HYPO A BEVEL 22X1 1/2

## (undated) DEVICE — GLOVE BIOGEL ECLIPSE SZ 7.5

## (undated) DEVICE — GAUZE SPONGE PEANUT STRL

## (undated) DEVICE — PACK BASIC

## (undated) DEVICE — SUT PRLENE 1CT 1 BL MONO 30

## (undated) DEVICE — SUT MONOCYRL 4-0 PS2 UND

## (undated) DEVICE — SEE MEDLINE ITEM 157117

## (undated) DEVICE — DRESSING XEROFORM FOIL PK 1X8

## (undated) DEVICE — DRESSING TRANS 4X4 TEGADERM

## (undated) DEVICE — MANIFOLD 4 PORT

## (undated) DEVICE — SEE MEDLINE ITEM 156955

## (undated) DEVICE — SUT 3-0 VICRYL / SH (J416)

## (undated) DEVICE — COVER OVERHEAD SURG LT BLUE